# Patient Record
Sex: FEMALE | Race: BLACK OR AFRICAN AMERICAN | NOT HISPANIC OR LATINO | Employment: FULL TIME | URBAN - METROPOLITAN AREA
[De-identification: names, ages, dates, MRNs, and addresses within clinical notes are randomized per-mention and may not be internally consistent; named-entity substitution may affect disease eponyms.]

---

## 2018-01-31 ENCOUNTER — TELEPHONE (OUTPATIENT)
Dept: INTERNAL MEDICINE | Facility: CLINIC | Age: 38
End: 2018-01-31

## 2018-01-31 NOTE — TELEPHONE ENCOUNTER
----- Message from Ekta Ruiz sent at 1/31/2018  7:59 AM CST -----  Contact: Pt  Pt called to speak to the nurse requesting an earlier appt and Rx refill. Pt is a previous pt of the provider prior to the doctor coming to [facility].    Pt can be reached at [phone].    Thanks.    CHRISTIAN

## 2018-03-15 ENCOUNTER — OFFICE VISIT (OUTPATIENT)
Dept: INTERNAL MEDICINE | Facility: CLINIC | Age: 38
End: 2018-03-15
Payer: COMMERCIAL

## 2018-03-15 ENCOUNTER — LAB VISIT (OUTPATIENT)
Dept: LAB | Facility: HOSPITAL | Age: 38
End: 2018-03-15
Attending: INTERNAL MEDICINE
Payer: COMMERCIAL

## 2018-03-15 VITALS
HEIGHT: 67 IN | SYSTOLIC BLOOD PRESSURE: 120 MMHG | BODY MASS INDEX: 37.02 KG/M2 | HEART RATE: 62 BPM | DIASTOLIC BLOOD PRESSURE: 84 MMHG | WEIGHT: 235.88 LBS

## 2018-03-15 DIAGNOSIS — Z13.220 LIPID SCREENING: ICD-10-CM

## 2018-03-15 DIAGNOSIS — Z13.0 SCREENING, ANEMIA, DEFICIENCY, IRON: ICD-10-CM

## 2018-03-15 DIAGNOSIS — Z00.00 VISIT FOR ANNUAL HEALTH EXAMINATION: Primary | ICD-10-CM

## 2018-03-15 DIAGNOSIS — Z13.29 SCREENING FOR THYROID DISORDER: ICD-10-CM

## 2018-03-15 DIAGNOSIS — I10 BENIGN ESSENTIAL HYPERTENSION: ICD-10-CM

## 2018-03-15 DIAGNOSIS — Z13.1 SCREENING FOR DIABETES MELLITUS: ICD-10-CM

## 2018-03-15 DIAGNOSIS — E07.89 THYROID FULLNESS: ICD-10-CM

## 2018-03-15 DIAGNOSIS — B35.1 ONYCHOMYCOSIS OF RIGHT GREAT TOE: ICD-10-CM

## 2018-03-15 LAB
ALBUMIN SERPL BCP-MCNC: 3.8 G/DL
ALP SERPL-CCNC: 82 U/L
ALT SERPL W/O P-5'-P-CCNC: 15 U/L
ANION GAP SERPL CALC-SCNC: 7 MMOL/L
AST SERPL-CCNC: 20 U/L
BASOPHILS # BLD AUTO: 0.01 K/UL
BASOPHILS NFR BLD: 0.2 %
BILIRUB SERPL-MCNC: 1.1 MG/DL
BILIRUB UR QL STRIP: NEGATIVE
BUN SERPL-MCNC: 16 MG/DL
CALCIUM SERPL-MCNC: 9.3 MG/DL
CHLORIDE SERPL-SCNC: 105 MMOL/L
CHOLEST SERPL-MCNC: 188 MG/DL
CHOLEST/HDLC SERPL: 3.5 {RATIO}
CLARITY UR REFRACT.AUTO: CLEAR
CO2 SERPL-SCNC: 28 MMOL/L
COLOR UR AUTO: YELLOW
CREAT SERPL-MCNC: 0.8 MG/DL
DIFFERENTIAL METHOD: NORMAL
EOSINOPHIL # BLD AUTO: 0 K/UL
EOSINOPHIL NFR BLD: 0.8 %
ERYTHROCYTE [DISTWIDTH] IN BLOOD BY AUTOMATED COUNT: 13.1 %
EST. GFR  (AFRICAN AMERICAN): >60 ML/MIN/1.73 M^2
EST. GFR  (NON AFRICAN AMERICAN): >60 ML/MIN/1.73 M^2
ESTIMATED AVG GLUCOSE: 97 MG/DL
GLUCOSE SERPL-MCNC: 97 MG/DL
GLUCOSE UR QL STRIP: NEGATIVE
HBA1C MFR BLD HPLC: 5 %
HCT VFR BLD AUTO: 39.3 %
HDLC SERPL-MCNC: 53 MG/DL
HDLC SERPL: 28.2 %
HGB BLD-MCNC: 13.4 G/DL
HGB UR QL STRIP: NEGATIVE
KETONES UR QL STRIP: NEGATIVE
LDLC SERPL CALC-MCNC: 122.2 MG/DL
LEUKOCYTE ESTERASE UR QL STRIP: NEGATIVE
LYMPHOCYTES # BLD AUTO: 2.2 K/UL
LYMPHOCYTES NFR BLD: 42.3 %
MCH RBC QN AUTO: 28.6 PG
MCHC RBC AUTO-ENTMCNC: 34.1 G/DL
MCV RBC AUTO: 84 FL
MONOCYTES # BLD AUTO: 0.5 K/UL
MONOCYTES NFR BLD: 8.9 %
NEUTROPHILS # BLD AUTO: 2.5 K/UL
NEUTROPHILS NFR BLD: 47.6 %
NITRITE UR QL STRIP: NEGATIVE
NONHDLC SERPL-MCNC: 135 MG/DL
PH UR STRIP: 7 [PH] (ref 5–8)
PLATELET # BLD AUTO: 270 K/UL
PMV BLD AUTO: 11 FL
POTASSIUM SERPL-SCNC: 3.9 MMOL/L
PROT SERPL-MCNC: 7.9 G/DL
PROT UR QL STRIP: NEGATIVE
RBC # BLD AUTO: 4.69 M/UL
SODIUM SERPL-SCNC: 140 MMOL/L
SP GR UR STRIP: 1.01 (ref 1–1.03)
TRIGL SERPL-MCNC: 64 MG/DL
TSH SERPL DL<=0.005 MIU/L-ACNC: 1.49 UIU/ML
URN SPEC COLLECT METH UR: NORMAL
UROBILINOGEN UR STRIP-ACNC: NEGATIVE EU/DL
WBC # BLD AUTO: 5.18 K/UL

## 2018-03-15 PROCEDURE — 3074F SYST BP LT 130 MM HG: CPT | Mod: CPTII,S$GLB,, | Performed by: INTERNAL MEDICINE

## 2018-03-15 PROCEDURE — 99385 PREV VISIT NEW AGE 18-39: CPT | Mod: S$GLB,,, | Performed by: INTERNAL MEDICINE

## 2018-03-15 PROCEDURE — 83036 HEMOGLOBIN GLYCOSYLATED A1C: CPT

## 2018-03-15 PROCEDURE — 85025 COMPLETE CBC W/AUTO DIFF WBC: CPT

## 2018-03-15 PROCEDURE — 99999 PR PBB SHADOW E&M-EST. PATIENT-LVL III: CPT | Mod: PBBFAC,,, | Performed by: INTERNAL MEDICINE

## 2018-03-15 PROCEDURE — 36415 COLL VENOUS BLD VENIPUNCTURE: CPT

## 2018-03-15 PROCEDURE — 84443 ASSAY THYROID STIM HORMONE: CPT

## 2018-03-15 PROCEDURE — 81003 URINALYSIS AUTO W/O SCOPE: CPT

## 2018-03-15 PROCEDURE — 80053 COMPREHEN METABOLIC PANEL: CPT

## 2018-03-15 PROCEDURE — 3079F DIAST BP 80-89 MM HG: CPT | Mod: CPTII,S$GLB,, | Performed by: INTERNAL MEDICINE

## 2018-03-15 PROCEDURE — 80061 LIPID PANEL: CPT

## 2018-03-15 RX ORDER — HYDROCHLOROTHIAZIDE 12.5 MG/1
25 CAPSULE ORAL DAILY
Status: CANCELLED | OUTPATIENT
Start: 2018-03-15

## 2018-03-15 RX ORDER — HYDROCHLOROTHIAZIDE 25 MG/1
25 TABLET ORAL DAILY
Qty: 90 TABLET | Refills: 2 | Status: SHIPPED | OUTPATIENT
Start: 2018-03-15 | End: 2018-08-03 | Stop reason: SDUPTHER

## 2018-03-15 RX ORDER — AMLODIPINE BESYLATE 5 MG/1
5 TABLET ORAL DAILY
COMMUNITY
End: 2018-03-15 | Stop reason: SDUPTHER

## 2018-03-15 RX ORDER — AMLODIPINE BESYLATE 5 MG/1
5 TABLET ORAL DAILY
Qty: 90 TABLET | Refills: 2 | Status: SHIPPED | OUTPATIENT
Start: 2018-03-15 | End: 2018-08-03 | Stop reason: SDUPTHER

## 2018-03-15 NOTE — PROGRESS NOTES
INTERNAL MEDICINE INITIAL VISIT NOTE      CHIEF COMPLAINT     Chief Complaint   Patient presents with    Annual Exam    Medication Refill       HPI     Saskia Huang is a 37 y.o. AA female who presents with HTN, here to re-establish care from [facility].    Still taking meds for HTN.  Says she is trying to eat better and exercise to lose weight to see if she can get off amlodipine in the future.    Denies cp or sob.  Denies h/a or blurry vision.  Occasional low back pain p prolonged sitting, no sciatic sx or leg weakness.    Also says gyn told her she was Hep C + but viral loads undetectable?  Records to be requested.    LMP 5 days ago.    Past Medical History:  Past Medical History:   Diagnosis Date    Benign essential hypertension 3/15/2018    Hypertension        Past Surgical History:  History reviewed. No pertinent surgical history.    Home Medications:  Prior to Admission medications    Medication Sig Start Date End Date Taking? Authorizing Provider   amLODIPine (NORVASC) 5 MG tablet Take 5 mg by mouth once daily.   Yes Historical Provider, MD   hydrochlorothiazide (MICROZIDE) 12.5 mg capsule Take 25 mg by mouth once daily.    Yes Historical Provider, MD   ibuprofen (ADVIL,MOTRIN) 600 MG tablet Take 1 tablet (600 mg total) by mouth every 8 (eight) hours as needed for Pain. 12/16/14   Kaiser Munoz MD       Allergies:  Review of patient's allergies indicates:  No Known Allergies    Family History:  Family History   Problem Relation Age of Onset    Hypertension Mother     No Known Problems Father     Diabetes Paternal Grandmother     Depression Neg Hx     Hearing loss Neg Hx        Social History:  Social History   Substance Use Topics    Smoking status: Never Smoker    Smokeless tobacco: Never Used    Alcohol use Yes      Comment: occasional, on holidays       Review of Systems:  Review of Systems   Constitutional: Negative for appetite change, chills, fatigue, fever and unexpected weight  "change.   HENT: Negative for congestion, hearing loss and rhinorrhea.    Eyes: Negative for pain and visual disturbance.   Respiratory: Negative for cough, chest tightness, shortness of breath and wheezing.    Cardiovascular: Negative for chest pain, palpitations and leg swelling.   Gastrointestinal: Negative for abdominal distention and abdominal pain.   Endocrine: Negative for polydipsia and polyuria.   Genitourinary: Negative for decreased urine volume, difficulty urinating, dysuria, hematuria and vaginal discharge.   Musculoskeletal: Positive for back pain.   Neurological: Negative for weakness, numbness and headaches.   Psychiatric/Behavioral: Negative for behavioral problems and confusion.       Health Maintenance:   Immunizations:   Influenza is not up to date, pt declined, Risks and benefits were discussed with patient.  TDap within 10 yrs per pt, thinks [facility] at [facility]    Cancer Screening:  PAP:  3/2018 at [facility], told normal, records requested  Mammogram:rec at 42  Colonoscopy: rec at 52      PHYSICAL EXAM     /84 (BP Location: Right arm, Patient Position: Sitting, BP Method: Large (Manual))   Pulse 62   Ht 5' 7" (1.702 m)   Wt 107 kg (235 lb 14.3 oz)   BMI 36.95 kg/m²     GEN - A+OX4, NAD   HEENT - PERRL, EOMI, OP clear  Neck - Thyroid fullness noted.  No cervical LAD.   CV - RRR, no m/r/g  Chest - CTAB, no wheezing, crackles, or rhonchi  Abd - S/NT/ND/+BS.   Ext - 2+BDP and radial pulses. No C/C/E.  LN - No LAD appreciated.  Skin - Normal color and texture, no rash, no skin lesions.  R great toe covered c polish but thickening noted.      LABS         ASSESSMENT/PLAN     Saskia Huang is a 37 y.o. female with  Freddreana was seen today for annual exam and medication refill.    Diagnoses and all orders for this visit:    Visit for annual health examination  History and physical exam completed.  Health maintenance reviewed as above.    Benign essential hypertension  At goal, cont meds  -     " "amLODIPine (NORVASC) 5 MG tablet; Take 1 tablet (5 mg total) by mouth once daily.  -     hydroCHLOROthiazide (HYDRODIURIL) 25 MG tablet; Take 1 tablet (25 mg total) by mouth once daily.  -     Comprehensive metabolic panel; Future; Expected date: 03/15/2018  -     URINALYSIS    Screening, anemia, deficiency, iron  -     CBC auto differential; Future; Expected date: 03/15/2018    Lipid screening  -     Lipid panel; Future; Expected date: 03/15/2018    Screening for diabetes mellitus  -     Hemoglobin A1c; Future; Expected date: 03/15/2018    Screening for thyroid disorder  -     TSH; Future; Expected date: 03/15/2018  Thyroid fullness on exam but says she thinks testing done at [facility] was "normal", records requested.    Onychomycosis of right great toe  -     Ambulatory Referral to Dermatology    ?Hep C  Based on what pt was told, pt likely had Hep C Ab but undetectable viral loads so either false +Ab or cleared infection.  Regardless, no further tx at this time but will check basic labs and records from [facility] requested.    HM as above    RTC in 5 months, sooner if needed and depending on labs.    Pebbles Bernal MD  Department of Internal Medicine - [facility]  03/15/2018  "

## 2018-04-05 ENCOUNTER — INITIAL CONSULT (OUTPATIENT)
Dept: DERMATOLOGY | Facility: CLINIC | Age: 38
End: 2018-04-05
Payer: COMMERCIAL

## 2018-04-05 DIAGNOSIS — L21.9 SEBORRHEIC DERMATITIS: ICD-10-CM

## 2018-04-05 DIAGNOSIS — B35.1 ONYCHOMYCOSIS: Primary | ICD-10-CM

## 2018-04-05 PROCEDURE — 99999 PR PBB SHADOW E&M-EST. PATIENT-LVL II: CPT | Mod: PBBFAC,,, | Performed by: NURSE PRACTITIONER

## 2018-04-05 PROCEDURE — 99201 PR OFFICE/OUTPT VISIT,NEW,LEVL I: CPT | Mod: S$GLB,,, | Performed by: NURSE PRACTITIONER

## 2018-04-05 RX ORDER — KETOCONAZOLE 20 MG/ML
SHAMPOO, SUSPENSION TOPICAL
Qty: 120 ML | Refills: 5 | Status: SHIPPED | OUTPATIENT
Start: 2018-04-05 | End: 2019-03-19

## 2018-04-05 RX ORDER — NEOMYCIN SULFATE, POLYMYXIN B SULFATE AND HYDROCORTISONE 10; 3.5; 1 MG/ML; MG/ML; [USP'U]/ML
SUSPENSION/ DROPS AURICULAR (OTIC)
Qty: 30 ML | Refills: 3 | Status: SHIPPED | OUTPATIENT
Start: 2018-04-05 | End: 2019-03-19

## 2018-04-05 NOTE — LETTER
April 5, 2018      Pebbles Bernal MD  [address]  [facility] [address]           [facility] - Dermatology  [address]  [facility] [address]  Phone: [phone]  Fax: [phone]          Patient: Saskia Huang   MR Number: [id number]   YOB: 1980   Date of Visit: 4/5/2018       Dear Dr. Martel:    Thank you for referring Saskia Huang to me for evaluation. Attached you will find relevant portions of my assessment and plan of care.    If you have questions, please do not hesitate to call me. I look forward to following Saskia Huang along with you.    Sincerely,    Dayna Simmons, SINAN    Enclosure  CC:  No Recipients    If you would like to receive this communication electronically, please contact [email or web address] or [phone] to request more information on [employer] access.    For providers and/or their staff who would like to refer a patient to [facility], please contact us through our one-stop-shop provider referral line, [facility], at [phone].    If you feel you have received this communication in error or would no longer like to receive these types of communications, please e-mail [email or web address]

## 2018-04-05 NOTE — PROGRESS NOTES
Subjective:       Patient ID:  Saskia Huang is a 37 y.o. female who presents for   Chief Complaint   Patient presents with    Nail Problem     left and right big toe    Dry Skin     ears     Hair/Scalp Problem     face and chest     This is a new patient here for evaluation nail issues       Nail Problem  - Initial  Affected locations: left toes (Left great toe)  Duration: 6 years  Signs and Symptoms: Noticed Green color in nail with occasional tenderness. Currently has blue nail polish on.  Severity: mild  Timing: constant  Aggravated by: nothing  Relieving factors/Treatments tried: nothing  Improvement on treatment: no relief    Hair/Scalp Problem  - Initial  Affected locations: face and left ear  Duration: 10 years  Signs / symptoms: itching, scaling and dryness  Severity: mild  Timing: intermittent  Aggravated by: nothing  Relieving factors/Treatments tried: nothing  Improvement on treatment: no relief        Review of Systems   Constitutional: Negative for fever.   Skin: Positive for itching ( scalp) and tendency to form keloidal scars. Negative for rash and dry skin.   Hematologic/Lymphatic: Does not bruise/bleed easily.        Objective:    Physical Exam   Constitutional: She appears well-developed and well-nourished. She is obese.  No distress.   Neurological: She is alert and oriented to person, place, and time. She is not disoriented.   Psychiatric: She has a normal mood and affect.   Skin:   Areas Examined (abnormalities noted in diagram):   Scalp / Hair Palpated and Inspected  Head / Face Inspection Performed                  Diagram Legend     Erythematous scaling macule/papule c/w actinic keratosis       Vascular papule c/w angioma      Pigmented verrucoid papule/plaque c/w seborrheic keratosis      Yellow umbilicated papule c/w sebaceous hyperplasia      Irregularly shaped tan macule c/w lentigo     1-2 mm smooth white papules consistent with Milia      Movable subcutaneous cyst with  punctum c/w epidermal inclusion cyst      Subcutaneous movable cyst c/w pilar cyst      Firm pink to brown papule c/w dermatofibroma      Pedunculated fleshy papule(s) c/w skin tag(s)      Evenly pigmented macule c/w junctional nevus     Mildly variegated pigmented, slightly irregular-bordered macule c/w mildly atypical nevus      Flesh colored to evenly pigmented papule c/w intradermal nevus       Pink pearly papule/plaque c/w basal cell carcinoma      Erythematous hyperkeratotic cursted plaque c/w SCC      Surgical scar with no sign of skin cancer recurrence      Open and closed comedones      Inflammatory papules and pustules      Verrucoid papule consistent consistent with wart     Erythematous eczematous patches and plaques     Dystrophic onycholytic nail with subungual debris c/w onychomycosis     Umbilicated papule    Erythematous-base heme-crusted tan verrucoid plaque consistent with inflamed seborrheic keratosis     Erythematous Silvery Scaling Plaque c/w Psoriasis     See annotation      Assessment / Plan:        Onychomycosis- possible Pseudomonas   -     neomycin-polymyxin-hydrocortisone (CORTISPORIN) 3.5-10,000-1 mg/mL-unit/mL-% otic suspension; 2 drops under affected nail(s) bid  Dispense: 30 mL; Refill: 3    Seborrheic dermatitis  -     ketoconazole (NIZORAL) 2 % shampoo; Wash hair with medicated shampoo at least 2x/week - let sit on scalp at least 5 minutes prior to rinsing  Dispense: 120 mL; Refill: 5             Follow-up in about 2 months (around 6/5/2018).

## 2018-08-03 DIAGNOSIS — I10 BENIGN ESSENTIAL HYPERTENSION: ICD-10-CM

## 2018-08-03 RX ORDER — HYDROCHLOROTHIAZIDE 25 MG/1
25 TABLET ORAL DAILY
Qty: 90 TABLET | Refills: 0 | Status: SHIPPED | OUTPATIENT
Start: 2018-08-03 | End: 2019-01-08 | Stop reason: SDUPTHER

## 2018-08-03 RX ORDER — AMLODIPINE BESYLATE 5 MG/1
5 TABLET ORAL DAILY
Qty: 90 TABLET | Refills: 0 | Status: SHIPPED | OUTPATIENT
Start: 2018-08-03 | End: 2019-03-19 | Stop reason: SDUPTHER

## 2018-08-03 NOTE — TELEPHONE ENCOUNTER
----- Message from Elizabeth Islas sent at 8/3/2018  9:46 AM CDT -----  Contact: pt [phone]  Pt called to request refills for 2 of her meds to be called in to:    [facility] [id number] - [address] AT [facility] [phone] (Phone)  [phone] (Fax)    amLODIPine (NORVASC) 5 MG tablet 90 tablet 2 3/15/2018    Sig - Route: Take 1 tablet (5 mg total) by mouth once daily. - Oral     hydroCHLOROthiazide (HYDRODIURIL) 25 MG tablet 90 tablet 2 3/15/2018 3/15/2019   Sig - Route: Take 1 tablet (25 mg total) by mouth once daily. - Oral     Pt was last seen March 2018    Thanks  miles

## 2019-01-08 DIAGNOSIS — I10 BENIGN ESSENTIAL HYPERTENSION: ICD-10-CM

## 2019-01-09 RX ORDER — HYDROCHLOROTHIAZIDE 25 MG/1
TABLET ORAL
Qty: 90 TABLET | Refills: 0 | Status: SHIPPED | OUTPATIENT
Start: 2019-01-09 | End: 2019-03-19 | Stop reason: SDUPTHER

## 2019-03-19 ENCOUNTER — OFFICE VISIT (OUTPATIENT)
Dept: INTERNAL MEDICINE | Facility: CLINIC | Age: 39
End: 2019-03-19
Payer: COMMERCIAL

## 2019-03-19 VITALS
DIASTOLIC BLOOD PRESSURE: 64 MMHG | OXYGEN SATURATION: 99 % | HEART RATE: 84 BPM | BODY MASS INDEX: 37.51 KG/M2 | WEIGHT: 239 LBS | SYSTOLIC BLOOD PRESSURE: 110 MMHG | HEIGHT: 67 IN

## 2019-03-19 DIAGNOSIS — Z00.00 ANNUAL PHYSICAL EXAM: Primary | ICD-10-CM

## 2019-03-19 DIAGNOSIS — Z13.1 SCREENING FOR DIABETES MELLITUS: ICD-10-CM

## 2019-03-19 DIAGNOSIS — Z13.29 SCREENING FOR THYROID DISORDER: ICD-10-CM

## 2019-03-19 DIAGNOSIS — Z13.0 SCREENING, ANEMIA, DEFICIENCY, IRON: ICD-10-CM

## 2019-03-19 DIAGNOSIS — I10 BENIGN ESSENTIAL HYPERTENSION: ICD-10-CM

## 2019-03-19 PROCEDURE — 3078F DIAST BP <80 MM HG: CPT | Mod: CPTII,S$GLB,, | Performed by: INTERNAL MEDICINE

## 2019-03-19 PROCEDURE — 99395 PREV VISIT EST AGE 18-39: CPT | Mod: S$GLB,,, | Performed by: INTERNAL MEDICINE

## 2019-03-19 PROCEDURE — 3078F PR MOST RECENT DIASTOLIC BLOOD PRESSURE < 80 MM HG: ICD-10-PCS | Mod: CPTII,S$GLB,, | Performed by: INTERNAL MEDICINE

## 2019-03-19 PROCEDURE — 99999 PR PBB SHADOW E&M-EST. PATIENT-LVL III: ICD-10-PCS | Mod: PBBFAC,,, | Performed by: INTERNAL MEDICINE

## 2019-03-19 PROCEDURE — 3074F PR MOST RECENT SYSTOLIC BLOOD PRESSURE < 130 MM HG: ICD-10-PCS | Mod: CPTII,S$GLB,, | Performed by: INTERNAL MEDICINE

## 2019-03-19 PROCEDURE — 99999 PR PBB SHADOW E&M-EST. PATIENT-LVL III: CPT | Mod: PBBFAC,,, | Performed by: INTERNAL MEDICINE

## 2019-03-19 PROCEDURE — 99395 PR PREVENTIVE VISIT,EST,18-39: ICD-10-PCS | Mod: S$GLB,,, | Performed by: INTERNAL MEDICINE

## 2019-03-19 PROCEDURE — 3074F SYST BP LT 130 MM HG: CPT | Mod: CPTII,S$GLB,, | Performed by: INTERNAL MEDICINE

## 2019-03-19 RX ORDER — HYDROCHLOROTHIAZIDE 25 MG/1
25 TABLET ORAL DAILY
Qty: 90 TABLET | Refills: 3 | Status: SHIPPED | OUTPATIENT
Start: 2019-03-19 | End: 2020-04-15

## 2019-03-19 RX ORDER — AMLODIPINE BESYLATE 5 MG/1
5 TABLET ORAL DAILY
Qty: 90 TABLET | Refills: 3 | Status: SHIPPED | OUTPATIENT
Start: 2019-03-19 | End: 2020-04-15

## 2019-03-19 NOTE — PROGRESS NOTES
INTERNAL MEDICINE ESTABLISHED PATIENT VISIT NOTE    Subjective:     Chief Complaint: Annual Exam       Patient ID: Saskia Huang is a 38 y.o. female with HTN, last seen by me a year ago, here today for annual exam.    Has been on HCTZ and Amlodipine for HTN s issues.  Denies cp or sob.  Reports she is currently trying to get pregnant.  Had a miscarriage many years ago followed by a successful pregnancy complicated by pre-eclampsia and that child is now 12 years ago.  Had an IUD which was removed a year ago but has been unable to get pregnant since despite using an ovulation buck.    Past Medical History:  Past Medical History:   Diagnosis Date    Benign essential hypertension 3/15/2018    Hypertension        Home Medications:  Prior to Admission medications    Medication Sig Start Date End Date Taking? Authorizing Provider   amLODIPine (NORVASC) 5 MG tablet Take 1 tablet (5 mg total) by mouth once daily. 8/3/18   Klarissa Nguyen MD   hydroCHLOROthiazide (HYDRODIURIL) 25 MG tablet TAKE 1 TABLET(25 MG) BY MOUTH EVERY DAY 1/9/19   Felicity Truong MD   ketoconazole (NIZORAL) 2 % shampoo Wash hair with medicated shampoo at least 2x/week - let sit on scalp at least 5 minutes prior to rinsing 4/5/18   Dayna Simmons NP   neomycin-polymyxin-hydrocortisone (CORTISPORIN) 3.5-10,000-1 mg/mL-unit/mL-% otic suspension 2 drops under affected nail(s) bid 4/5/18   Dayna Simmons NP       Allergies:  Review of patient's allergies indicates:  No Known Allergies    Social History:  Social History     Tobacco Use    Smoking status: Never Smoker    Smokeless tobacco: Never Used   Substance Use Topics    Alcohol use: Yes     Comment: occasional, on holidays    Drug use: No        Review of Systems   Constitutional: Negative for appetite change, chills, fatigue, fever and unexpected weight change.   HENT: Negative for congestion, hearing loss and rhinorrhea.    Eyes: Negative for pain and visual disturbance.  "  Respiratory: Negative for cough, chest tightness, shortness of breath and wheezing.    Cardiovascular: Negative for chest pain, palpitations and leg swelling.   Gastrointestinal: Negative for abdominal distention and abdominal pain.   Endocrine: Negative for polydipsia and polyuria.   Genitourinary: Negative for decreased urine volume, difficulty urinating, dysuria, hematuria and vaginal discharge.   Neurological: Negative for weakness, numbness and headaches.   Psychiatric/Behavioral: Negative for behavioral problems and confusion.         Health Maintenance:     Immunizations:   Influenza is not up to date, pt declined, Risks and benefits were discussed with patient.  TDap within 10 yrs per pt, thinks possibly at [facility]     Cancer Screening:  PAP:  3/2018 at [facility], told normal, records requested, has f/u at [facility] today  Mammogram:rec at 42  Colonoscopy: rec at 52    Objective:   /64 (BP Location: Left arm, Patient Position: Sitting, BP Method: Large (Manual))   Pulse 84   Ht 5' 7" (1.702 m)   Wt 108.4 kg (238 lb 15.7 oz)   LMP 03/16/2019   SpO2 99%   BMI 37.43 kg/m²        General: AAO x3, no apparent distress  HEENT: PERRL, OP clear, thyroid gland appears full and firm  CV: RRR, no m/r/g  Pulm: Lungs CTAB, no crackles, no wheezes  Abd: s/NT/ND +BS  Extremities: no c/c/e    Labs:     Lab Results   Component Value Date    WBC 5.18 03/15/2018    HGB 13.4 03/15/2018    HCT 39.3 03/15/2018    MCV 84 03/15/2018     03/15/2018     CMP  Sodium   Date Value Ref Range Status   03/15/2018 140 136 - 145 mmol/L Final     Potassium   Date Value Ref Range Status   03/15/2018 3.9 3.5 - 5.1 mmol/L Final     Chloride   Date Value Ref Range Status   03/15/2018 105 95 - 110 mmol/L Final     CO2   Date Value Ref Range Status   03/15/2018 28 23 - 29 mmol/L Final     Glucose   Date Value Ref Range Status   03/15/2018 97 70 - 110 mg/dL Final     BUN, Bld   Date Value Ref Range Status   03/15/2018 16 6 - 20 mg/dL Final "     Creatinine   Date Value Ref Range Status   03/15/2018 0.8 0.5 - 1.4 mg/dL Final     Calcium   Date Value Ref Range Status   03/15/2018 9.3 8.7 - 10.5 mg/dL Final     Total Protein   Date Value Ref Range Status   03/15/2018 7.9 6.0 - 8.4 g/dL Final     Albumin   Date Value Ref Range Status   03/15/2018 3.8 3.5 - 5.2 g/dL Final     Total Bilirubin   Date Value Ref Range Status   03/15/2018 1.1 (H) 0.1 - 1.0 mg/dL Final     Comment:     For infants and newborns, interpretation of results should be based  on gestational age, weight and in agreement with clinical  observations.  Premature Infant recommended reference ranges:  Up to 24 hours.............<8.0 mg/dL  Up to 48 hours............<12.0 mg/dL  3-5 days..................<15.0 mg/dL  6-29 days.................<15.0 mg/dL       Alkaline Phosphatase   Date Value Ref Range Status   03/15/2018 82 55 - 135 U/L Final     AST   Date Value Ref Range Status   03/15/2018 20 10 - 40 U/L Final     ALT   Date Value Ref Range Status   03/15/2018 15 10 - 44 U/L Final     Anion Gap   Date Value Ref Range Status   03/15/2018 7 (L) 8 - 16 mmol/L Final     eGFR if    Date Value Ref Range Status   03/15/2018 >60 >60 mL/min/1.73 m^2 Final     eGFR if non    Date Value Ref Range Status   03/15/2018 >60 >60 mL/min/1.73 m^2 Final     Comment:     Calculation used to obtain the estimated glomerular filtration  rate (eGFR) is the CKD-EPI equation.        Lab Results   Component Value Date    LDLCALC 122.2 03/15/2018     Lab Results   Component Value Date    TSH 1.486 03/15/2018     Lab Results   Component Value Date    HGBA1C 5.0 03/15/2018         Assessment/Plan     Saskia was seen today for annual exam.    Diagnoses and all orders for this visit:    Annual physical exam  History and physical exam completed.  Health maintenance reviewed as above.  -     Comprehensive metabolic panel; Future  -     Lipid panel; Future    Benign essential  hypertension  at goal.  Cont current medications.  -     Comprehensive metabolic panel; Future  -     amLODIPine (NORVASC) 5 MG tablet; Take 1 tablet (5 mg total) by mouth once daily.  -     hydroCHLOROthiazide (HYDRODIURIL) 25 MG tablet; Take 1 tablet (25 mg total) by mouth once daily.  -     URINALYSIS; Future    Screening for diabetes mellitus  -     Hemoglobin A1c; Future    Screening, anemia, deficiency, iron  -     CBC auto differential; Future    Screening for thyroid disorder  Thyroid gland also appears full, will request records of u/s at [facility] to see if additional f/u u/s needed but pt states prior w/u was normal.  -     TSH; Future        HM as above  RTC in 6 mos for f/u, labs Monday when fasting.    Pebbles Bernal MD  Department of Internal Medicine - [facility]  03/19/2019

## 2019-05-23 ENCOUNTER — TELEPHONE (OUTPATIENT)
Dept: INTERNAL MEDICINE | Facility: CLINIC | Age: 39
End: 2019-05-23

## 2019-05-23 NOTE — TELEPHONE ENCOUNTER
----- Message from Sana Lal sent at 5/23/2019  7:23 AM CDT -----  Contact: Self  Pt is calling to speak with you because she has concerns about her blood sugar.    She can be reached at [phone].    Thank you.

## 2019-05-23 NOTE — TELEPHONE ENCOUNTER
Spoke with pt and she states she have a tingling feeling in her shoulder area. Pt was concerned about her blood sugar but I told her 101 after meals is ok. Pt bp was 117/71. Pt will go to urgent care or ER if sx get worse but pt states she can wait until Monday to see a provider.

## 2019-06-04 ENCOUNTER — HOSPITAL ENCOUNTER (EMERGENCY)
Facility: HOSPITAL | Age: 39
Discharge: HOME | End: 2019-06-04
Attending: EMERGENCY MEDICINE
Payer: COMMERCIAL

## 2019-06-04 VITALS
BODY MASS INDEX: 37.67 KG/M2 | HEART RATE: 80 BPM | RESPIRATION RATE: 16 BRPM | OXYGEN SATURATION: 100 % | SYSTOLIC BLOOD PRESSURE: 127 MMHG | WEIGHT: 240 LBS | DIASTOLIC BLOOD PRESSURE: 70 MMHG | HEIGHT: 67 IN | TEMPERATURE: 99 F

## 2019-06-04 DIAGNOSIS — R07.9 CHEST PAIN: ICD-10-CM

## 2019-06-04 DIAGNOSIS — R07.89 CHEST PAIN, NON-CARDIAC: Primary | ICD-10-CM

## 2019-06-04 LAB
ALBUMIN SERPL BCP-MCNC: 3.6 G/DL (ref 3.5–5.2)
ALP SERPL-CCNC: 82 U/L (ref 55–135)
ALT SERPL W/O P-5'-P-CCNC: 11 U/L (ref 10–44)
ANION GAP SERPL CALC-SCNC: 7 MMOL/L (ref 8–16)
AST SERPL-CCNC: 19 U/L (ref 10–40)
B-HCG UR QL: NEGATIVE
BASOPHILS # BLD AUTO: 0.03 K/UL (ref 0–0.2)
BASOPHILS NFR BLD: 0.4 % (ref 0–1.9)
BILIRUB SERPL-MCNC: 0.7 MG/DL (ref 0.1–1)
BNP SERPL-MCNC: 13 PG/ML (ref 0–99)
BUN SERPL-MCNC: 16 MG/DL (ref 6–20)
CALCIUM SERPL-MCNC: 9.7 MG/DL (ref 8.7–10.5)
CHLORIDE SERPL-SCNC: 104 MMOL/L (ref 95–110)
CO2 SERPL-SCNC: 27 MMOL/L (ref 23–29)
CREAT SERPL-MCNC: 0.9 MG/DL (ref 0.5–1.4)
CTP QC/QA: YES
DIFFERENTIAL METHOD: NORMAL
EOSINOPHIL # BLD AUTO: 0.1 K/UL (ref 0–0.5)
EOSINOPHIL NFR BLD: 0.9 % (ref 0–8)
ERYTHROCYTE [DISTWIDTH] IN BLOOD BY AUTOMATED COUNT: 12.8 % (ref 11.5–14.5)
EST. GFR  (AFRICAN AMERICAN): >60 ML/MIN/1.73 M^2
EST. GFR  (NON AFRICAN AMERICAN): >60 ML/MIN/1.73 M^2
GLUCOSE SERPL-MCNC: 89 MG/DL (ref 70–110)
HCT VFR BLD AUTO: 39.3 % (ref 37–48.5)
HGB BLD-MCNC: 12.9 G/DL (ref 12–16)
IMM GRANULOCYTES # BLD AUTO: 0.02 K/UL (ref 0–0.04)
IMM GRANULOCYTES NFR BLD AUTO: 0.3 % (ref 0–0.5)
LYMPHOCYTES # BLD AUTO: 3.1 K/UL (ref 1–4.8)
LYMPHOCYTES NFR BLD: 44.3 % (ref 18–48)
MCH RBC QN AUTO: 28.9 PG (ref 27–31)
MCHC RBC AUTO-ENTMCNC: 32.8 G/DL (ref 32–36)
MCV RBC AUTO: 88 FL (ref 82–98)
MONOCYTES # BLD AUTO: 0.5 K/UL (ref 0.3–1)
MONOCYTES NFR BLD: 7 % (ref 4–15)
NEUTROPHILS # BLD AUTO: 3.3 K/UL (ref 1.8–7.7)
NEUTROPHILS NFR BLD: 47.1 % (ref 38–73)
NRBC BLD-RTO: 0 /100 WBC
PLATELET # BLD AUTO: 230 K/UL (ref 150–350)
PMV BLD AUTO: 11.1 FL (ref 9.2–12.9)
POCT GLUCOSE: 114 MG/DL (ref 70–110)
POTASSIUM SERPL-SCNC: 3.9 MMOL/L (ref 3.5–5.1)
PROT SERPL-MCNC: 8 G/DL (ref 6–8.4)
RBC # BLD AUTO: 4.47 M/UL (ref 4–5.4)
SODIUM SERPL-SCNC: 138 MMOL/L (ref 136–145)
TROPONIN I SERPL DL<=0.01 NG/ML-MCNC: 0.01 NG/ML (ref 0–0.03)
WBC # BLD AUTO: 7 K/UL (ref 3.9–12.7)

## 2019-06-04 PROCEDURE — 84484 ASSAY OF TROPONIN QUANT: CPT

## 2019-06-04 PROCEDURE — 63600175 PHARM REV CODE 636 W HCPCS: Performed by: PHYSICIAN ASSISTANT

## 2019-06-04 PROCEDURE — 82962 GLUCOSE BLOOD TEST: CPT

## 2019-06-04 PROCEDURE — 85025 COMPLETE CBC W/AUTO DIFF WBC: CPT

## 2019-06-04 PROCEDURE — 80053 COMPREHEN METABOLIC PANEL: CPT

## 2019-06-04 PROCEDURE — 99285 EMERGENCY DEPT VISIT HI MDM: CPT | Mod: 25

## 2019-06-04 PROCEDURE — 99284 PR EMERGENCY DEPT VISIT,LEVEL IV: ICD-10-PCS | Mod: ,,, | Performed by: PHYSICIAN ASSISTANT

## 2019-06-04 PROCEDURE — 93010 EKG 12-LEAD: ICD-10-PCS | Mod: ,,, | Performed by: INTERNAL MEDICINE

## 2019-06-04 PROCEDURE — 93005 ELECTROCARDIOGRAM TRACING: CPT

## 2019-06-04 PROCEDURE — 81025 URINE PREGNANCY TEST: CPT | Performed by: PHYSICIAN ASSISTANT

## 2019-06-04 PROCEDURE — 96374 THER/PROPH/DIAG INJ IV PUSH: CPT

## 2019-06-04 PROCEDURE — 99284 EMERGENCY DEPT VISIT MOD MDM: CPT | Mod: ,,, | Performed by: PHYSICIAN ASSISTANT

## 2019-06-04 PROCEDURE — 93010 ELECTROCARDIOGRAM REPORT: CPT | Mod: ,,, | Performed by: INTERNAL MEDICINE

## 2019-06-04 PROCEDURE — 83880 ASSAY OF NATRIURETIC PEPTIDE: CPT

## 2019-06-04 RX ORDER — NAPROXEN 500 MG/1
500 TABLET ORAL 2 TIMES DAILY WITH MEALS
Qty: 10 TABLET | Refills: 0 | Status: SHIPPED | OUTPATIENT
Start: 2019-06-04 | End: 2019-09-19

## 2019-06-04 RX ORDER — KETOROLAC TROMETHAMINE 30 MG/ML
10 INJECTION, SOLUTION INTRAMUSCULAR; INTRAVENOUS
Status: COMPLETED | OUTPATIENT
Start: 2019-06-04 | End: 2019-06-04

## 2019-06-04 RX ADMIN — KETOROLAC TROMETHAMINE 10 MG: 30 INJECTION, SOLUTION INTRAMUSCULAR; INTRAVENOUS at 08:06

## 2019-06-05 NOTE — ED PROVIDER NOTES
Encounter Date: 6/4/2019    SCRIBE #1 NOTE: I, Tania Virgil, am scribing for, and in the presence of,  Jazlyn Hernandes MD. I have scribed the following portions of the note - the EKG reading.       History     Chief Complaint   Patient presents with    Chest Pain     x 2 days, describes as nonradiating needle pricks intermittently.  resolves when lifting arm in the air.    Leg Swelling     38-year-old female with past medical history of HTN presents to the ED with a chief complaint of chest pain.  Patient reports left-sided upper chest wall pain that began last night.  Patient describes the pain as sharp, stabbing like a needle.  The pain has been episodic since yesterday.  She reports a handful of episodes. The pain lasts for only a couple of minutes.  Pain improves if patient raises her arm.  She also reports some tingling in the left arm, but states that this has been present prior to the chest pain.  Patient denies shortness breath.  She does report a recent dry cough.  Denies fever, chills. Patient also complains of intermittent swelling to the ankles and feet over the past year.  Swelling is usually worse when she is on her feet.  Patient is typically sedentary as she works at home.  She denies calf pain or swelling, hormone use, recent immobilization.  Denies family or personal cardiac history.        Review of patient's allergies indicates:  No Known Allergies  Past Medical History:   Diagnosis Date    Benign essential hypertension 3/15/2018    Hypertension      History reviewed. No pertinent surgical history.  Family History   Problem Relation Age of Onset    Hypertension Mother     No Known Problems Father     Diabetes Paternal Grandmother     Depression Neg Hx     Hearing loss Neg Hx     Melanoma Neg Hx      Social History     Tobacco Use    Smoking status: Never Smoker    Smokeless tobacco: Never Used   Substance Use Topics    Alcohol use: Yes     Comment: occasional, on holidays    Drug  use: No     Review of Systems   Constitutional: Negative for chills and fever.   HENT: Negative for sore throat.    Respiratory: Positive for cough. Negative for shortness of breath.    Cardiovascular: Positive for chest pain and leg swelling.   Gastrointestinal: Negative for nausea and vomiting.   Genitourinary: Negative for dysuria.   Musculoskeletal: Negative for back pain.   Skin: Negative for rash.   Neurological: Negative for weakness.   Hematological: Does not bruise/bleed easily.       Physical Exam     Initial Vitals [06/04/19 1916]   BP Pulse Resp Temp SpO2   127/70 80 16 98.5 °F (36.9 °C) 100 %      MAP       --         Physical Exam    Nursing note and vitals reviewed.  Constitutional: She appears well-developed and well-nourished. She is not diaphoretic.  Non-toxic appearance. She does not appear ill. No distress.   HENT:   Head: Normocephalic and atraumatic.   Neck: Neck supple.   Cardiovascular: Normal rate and regular rhythm. Exam reveals no gallop and no friction rub.    No murmur heard.  Nonpitting edema to the ankles bilaterally.   Pulmonary/Chest: Effort normal and breath sounds normal. No accessory muscle usage. No tachypnea. No respiratory distress. She has no decreased breath sounds. She has no wheezes. She has no rhonchi. She has no rales.       Tenderness to the left upper chest wall that is different in quality to patient's stated complaint.   Abdominal: She exhibits no distension.   Neurological: She is alert.   Skin: No rash noted.   Psychiatric: She has a normal mood and affect. Her behavior is normal.         ED Course   Procedures  Labs Reviewed   COMPREHENSIVE METABOLIC PANEL - Abnormal; Notable for the following components:       Result Value    Anion Gap 7 (*)     All other components within normal limits   POCT GLUCOSE - Abnormal; Notable for the following components:    POCT Glucose 114 (*)     All other components within normal limits   B-TYPE NATRIURETIC PEPTIDE   CBC W/ AUTO  DIFFERENTIAL   TROPONIN I   POCT URINE PREGNANCY     EKG Readings: (Independently Interpreted)   Normal sinus rhythm, rate 65. . QRS 86 . QTc 455. T wave inversion in leads III and lesser in V3      ECG Results          EKG 12-lead (Final result)  Result time 06/05/19 10:14:05    Final result by Interface, Lab In [facility] (06/05/19 10:14:05)                 Narrative:    Test Reason : R07.9,    Vent. Rate : 065 BPM     Atrial Rate : 065 BPM     P-R Int : 190 ms          QRS Dur : 086 ms      QT Int : 438 ms       P-R-T Axes : 032 -02 -01 degrees     QTc Int : 455 ms    Normal sinus rhythm  Nonspecific ST and/or T wave abnormalities  No previous ECGs available  Confirmed by JESSIE LYONS MD ([id number]) on 6/5/2019 10:13:56 AM    Referred By: ESTEBAN   SELF           Confirmed By:JESSIE LYONS MD                            Imaging Results          X-Ray Chest PA And Lateral (Final result)  Result time 06/04/19 20:50:05    Final result by Ladi Payne MD (06/04/19 20:50:05)                 Impression:      Normal.      Electronically signed by: Ladi Payne  Date:    06/04/2019  Time:    20:50             Narrative:    EXAMINATION:  XR CHEST PA AND LATERAL    CLINICAL HISTORY:  Chest pain, unspecified    TECHNIQUE:  PA and lateral views of the chest were performed.    COMPARISON:  None    FINDINGS:  Frontal and lateral views.    Heart and lungs are normal.  No pneumothorax or pleural effusion or edema or consolidation or nodule.  The visualized spine and trachea and abdomen appear normal.  No fracture found.                              X-Rays:   Independently Interpreted Readings:   Chest X-Ray: Normal heart size.  No infiltrates.  No acute abnormalities.     Medical Decision Making:   History:   Old Medical Records: I decided to obtain old medical records.  Differential Diagnosis:   My differential diagnosis includes but is not limited to:  Costochondritis, pleurisy, chest wall strain, ACS,  pneumothorax  Independently Interpreted Test(s):   I have ordered and independently interpreted X-rays - see prior notes.  I have ordered and independently interpreted EKG Reading(s) - see prior notes  Clinical Tests:   Radiological Study: Ordered and Reviewed  Medical Tests: Ordered and Reviewed       APC / Resident Notes:   38-year-old female presents for evaluation of chest pain. Vitals within normal limits.  Patient is in no acute distress. Pertinent physical exam findings are noted above.  Patient is low risk for PE.    EKG without ischemic patterns.  Chest x-ray unremarkable. Cardiac enzymes are unremarkable. Patient likely with musculoskeletal etiology.  I will treat with naproxen.  Patient advised to follow up with her PCP in 1 week if no improvement with treatment.  ED return precautions given.  Stable for discharge. I have reviewed the patient's records and discussed this case with my supervising physician.         Scribe Attestation:   Scribe #1: I performed the above scribed service and the documentation accurately describes the services I performed. I attest to the accuracy of the note.    Attending Attestation:     Physician Attestation Statement for NP/PA:   I discussed this assessment and plan of this patient with the NP/PA, but I did not personally examine the patient. The face to face encounter was performed by the NP/PA.                     Clinical Impression:       ICD-10-CM ICD-9-CM   1. Chest pain, non-cardiac R07.89 786.59   2. Chest pain R07.9 786.50         Disposition:   Disposition: Discharged  Condition: Stable                        Ame Padilla PA-C  06/05/19 2048       Jazlyn Hernandes MD  06/10/19 0797

## 2019-06-05 NOTE — ED TRIAGE NOTES
Pt to the ED with complaints of left sided chest wall pain that began yesterday. Tenderness with palpation to left chest. Pain describes as nonradiating, resolves when lifting arm in the air. No SOB. Pt also reports bilateral ankle swelling that has been intermittent over the past 2-3 months.     Patient identifiers verified and correct for Saskia Huang.     LOC: The patient is awake, alert and aware of environment with an appropriate affect, the patient is oriented x 3 and speaking appropriately.   APPEARANCE: Patient appears comfortable and in no acute distress, patient is clean and well groomed.  SKIN: The skin is warm and dry, color consistent with ethnicity, patient has normal skin turgor and moist mucus membranes, skin intact, no breakdown or bruising noted.   MUSCULOSKELETAL: Patient moving all extremities spontaneously, no swelling noted.  RESPIRATORY: Airway is open and patent, respirations are spontaneous, patient has a normal effort and rate, no accessory muscle use noted.  CARDIAC: Patient has a normal rate and regular rhythm, capillary refill < 3 seconds. Patient reports left sided chest wall pain.   GASTRO: Soft and non tender to palpation, no distention noted.   : Pt denies any pain or frequency with urination.  NEURO: Pt opens eyes spontaneously, behavior appropriate to situation, follows commands, facial expression symmetrical, bilateral hand grasp equal and even, purposeful motor response noted, normal sensation in all extremities when touched with a finger.

## 2019-09-19 ENCOUNTER — OFFICE VISIT (OUTPATIENT)
Dept: INTERNAL MEDICINE | Facility: CLINIC | Age: 39
End: 2019-09-19
Payer: COMMERCIAL

## 2019-09-19 VITALS
SYSTOLIC BLOOD PRESSURE: 106 MMHG | BODY MASS INDEX: 38.76 KG/M2 | HEIGHT: 67 IN | DIASTOLIC BLOOD PRESSURE: 82 MMHG | HEART RATE: 79 BPM | WEIGHT: 246.94 LBS

## 2019-09-19 DIAGNOSIS — Z48.02 VISIT FOR SUTURE REMOVAL: ICD-10-CM

## 2019-09-19 DIAGNOSIS — I10 HYPERTENSION, ESSENTIAL: ICD-10-CM

## 2019-09-19 DIAGNOSIS — M54.6 ACUTE THORACIC BACK PAIN, UNSPECIFIED BACK PAIN LATERALITY: Primary | ICD-10-CM

## 2019-09-19 PROCEDURE — 99999 PR PBB SHADOW E&M-EST. PATIENT-LVL III: ICD-10-PCS | Mod: PBBFAC,,, | Performed by: PHYSICIAN ASSISTANT

## 2019-09-19 PROCEDURE — 3008F BODY MASS INDEX DOCD: CPT | Mod: CPTII,S$GLB,, | Performed by: PHYSICIAN ASSISTANT

## 2019-09-19 PROCEDURE — 99214 PR OFFICE/OUTPT VISIT, EST, LEVL IV, 30-39 MIN: ICD-10-PCS | Mod: S$GLB,,, | Performed by: PHYSICIAN ASSISTANT

## 2019-09-19 PROCEDURE — 3079F PR MOST RECENT DIASTOLIC BLOOD PRESSURE 80-89 MM HG: ICD-10-PCS | Mod: CPTII,S$GLB,, | Performed by: PHYSICIAN ASSISTANT

## 2019-09-19 PROCEDURE — 3008F PR BODY MASS INDEX (BMI) DOCUMENTED: ICD-10-PCS | Mod: CPTII,S$GLB,, | Performed by: PHYSICIAN ASSISTANT

## 2019-09-19 PROCEDURE — 99999 PR PBB SHADOW E&M-EST. PATIENT-LVL III: CPT | Mod: PBBFAC,,, | Performed by: PHYSICIAN ASSISTANT

## 2019-09-19 PROCEDURE — 3074F PR MOST RECENT SYSTOLIC BLOOD PRESSURE < 130 MM HG: ICD-10-PCS | Mod: CPTII,S$GLB,, | Performed by: PHYSICIAN ASSISTANT

## 2019-09-19 PROCEDURE — 99214 OFFICE O/P EST MOD 30 MIN: CPT | Mod: S$GLB,,, | Performed by: PHYSICIAN ASSISTANT

## 2019-09-19 PROCEDURE — 3079F DIAST BP 80-89 MM HG: CPT | Mod: CPTII,S$GLB,, | Performed by: PHYSICIAN ASSISTANT

## 2019-09-19 PROCEDURE — 3074F SYST BP LT 130 MM HG: CPT | Mod: CPTII,S$GLB,, | Performed by: PHYSICIAN ASSISTANT

## 2019-09-19 RX ORDER — METHOCARBAMOL 500 MG/1
500 TABLET, FILM COATED ORAL 4 TIMES DAILY
Qty: 40 TABLET | Refills: 0 | Status: SHIPPED | OUTPATIENT
Start: 2019-09-19 | End: 2019-09-29

## 2019-09-19 NOTE — LETTER
September 19, 2019    Saskia Huang  [address]  [place] [address]         [facility]  [address]  [facility] [address]  Phone: [phone]  Fax: [phone] September 19, 2019     Patient: Saskia Huang   YOB: 1980   Date of Visit: 9/19/2019       To Whom It May Concern:    It is my medical opinion that Saskia Huang should remain out of work until September 23, 2019.    If you have any questions or concerns, please don't hesitate to call.    Sincerely,        Isabel Clark PA-C

## 2019-09-19 NOTE — PROGRESS NOTES
Subjective:       Patient ID: Saskia Huang is a 39 y.o. female.        Chief Complaint: Back Pain (Pain=8 x3days) and Suture / Staple Removal (R arm)    Saskia Huang is an established patient of Pebbles Bernal MD here today for urgent care visit.    3 stitches right forearm - placed 3-4 weeks ago, never returned to remove, needs them out, unsure if she received tetanus vaccine at time of sutures    Back pain - upper thoracic, midline and left sided, started 3 days ago, hurts when she twists and moves, has been taking tylenol, also took her mother's muscle relaxer and tramadol (which seemed to really help), no N/V/D/C, no chest pain or shortness of breath, appetite is fine, no injury to the back         Review of Systems   Constitutional: Negative for chills, diaphoresis, fatigue and fever.   HENT: Negative for congestion and sore throat.    Eyes: Negative for visual disturbance.   Respiratory: Negative for cough, chest tightness and shortness of breath.    Cardiovascular: Negative for chest pain, palpitations and leg swelling.   Gastrointestinal: Negative for abdominal pain, blood in stool, constipation, diarrhea, nausea and vomiting.   Genitourinary: Negative for dysuria, frequency, hematuria and urgency.   Musculoskeletal: Positive for back pain. Negative for arthralgias.   Skin: Positive for wound (well healed, sutures in place). Negative for rash.   Neurological: Negative for dizziness, syncope, weakness and headaches.   Psychiatric/Behavioral: Negative for dysphoric mood and sleep disturbance. The patient is not nervous/anxious.        Objective:      Physical Exam   Constitutional: She appears well-developed and well-nourished.   HENT:   Head: Normocephalic.   Right Ear: External ear normal.   Left Ear: External ear normal.   Mouth/Throat: Oropharynx is clear and moist.   Eyes: Pupils are equal, round, and reactive to light.   Cardiovascular: Normal rate, regular rhythm and normal heart sounds.  "Exam reveals no gallop and no friction rub.   No murmur heard.  Pulmonary/Chest: Effort normal and breath sounds normal. No respiratory distress.   Abdominal: Soft. Normal appearance. There is no tenderness. There is no CVA tenderness.   Musculoskeletal: She exhibits no edema.        Thoracic back: She exhibits tenderness (left paravertebral area) and spasm. She exhibits normal range of motion.        Back:         Arms:  Neurological: She is alert. She has normal strength. No sensory deficit.   Skin: Skin is warm and dry.   Psychiatric: She has a normal mood and affect.   Nursing note and vitals reviewed.      Assessment:       1. Acute thoracic back pain, unspecified back pain laterality    2. Visit for suture removal    3. Hypertension, essential        Plan:       Saskia was seen today for back pain and suture / staple removal.    Diagnoses and all orders for this visit:    Acute thoracic back pain, unspecified back pain laterality  -     methocarbamol (ROBAXIN) 500 MG Tab; Take 1 tablet (500 mg total) by mouth 4 (four) times daily. for 10 days    Visit for suture removal: 3 sutures removed, she will try to obtain documentation of tetanus status, unsure when it was administered, only record in [facility] is from 2000...    Hypertension, essential: stable and controlled    Rest, ice, heat, muscle cream, gentle stretching.  Work note provided.    Pt has been given instructions populated from [employer] database and has verbalized understanding of the after visit summary and information contained wherein.    Follow up with a primary care provider. May go to ER for acute shortness of breath, lightheadedness, fever, or any other emergent complaints or changes in condition.    "This note will be shared with the patient"    No future appointments.            "

## 2019-09-19 NOTE — PATIENT INSTRUCTIONS
Ice, muscle cream like bengay or aspercream, heat, gentle stretching    Back Care Tips    Caring for your back  These are things you can do to prevent a recurrence of acute back pain and to reduce symptoms from chronic back pain:  · Maintain a healthy weight. If you are overweight, losing weight will help most types of back pain.  · Exercise is an important part of recovery from most types of back pain. The muscles behind and in front of the spine support the back. This means strengthening both the back muscles and the abdominal muscles will provide better support for your spine.   · Swimming and brisk walking are good overall exercises to improve your fitness level.  · Practice safe lifting methods (below).  · Practice good posture when sitting, standing and walking. Avoid prolonged sitting. This puts more stress on the lower back than standing or walking.  · Wear quality shoes with sufficient arch support. Foot and ankle alignment can affect back symptoms. Women should avoid wearing high heels.  · Therapeutic massage can help relax the back muscles without stretching them.  · During the first 24 to 72 hours after an acute injury or flare-up of chronic back pain, apply an ice pack to the painful area for 20 minutes and then remove it for 20 minutes, over a period of 60 to 90 minutes, or several times a day. As a safety precaution, do not use a heating pad at bedtime. Sleeping on a heating pad can lead to skin burns or tissue damage.  · You can alternate ice and heat therapies.  Medications  Talk to your healthcare provider before using medicines, especially if you have other medical problems or are taking other medicines.  · You may use acetaminophen or ibuprofen to control pain, unless your healthcare provider prescribed other pain medicine. If you have chronic conditions like diabetes, liver or kidney disease, stomach ulcers, or gastrointestinal bleeding, or are taking blood thinners, talk with your healthcare  provider before taking any medicines.  · Be careful if you are given prescription pain medicines, narcotics, or medicine for muscle spasm. They can cause drowsiness, affect your coordination, reflexes, and judgment. Do not drive or operate heavy machinery while taking these types of medicines. Take prescription pain medicine only as prescribed by your healthcare provider.  Lumbar stretch  Here is a simple stretching exercise that will help relax muscle spasm and keep your back more limber. If exercise makes your back pain worse, dont do it.  · Lie on your back with your knees bent and both feet on the ground.  · Slowly raise your left knee to your chest as you flatten your lower back against the floor. Hold for 5 seconds.  · Relax and repeat the exercise with your right knee.  · Do 10 of these exercises for each leg.  Safe lifting method  · Dont bend over at the waist to lift an object off the floor.  Instead, bend your knees and hips in a squat.   · Keep your back and head upright  · Hold the object close to your body, directly in front of you.  · Straighten your legs to lift the object.   · Lower the object to the floor in the reverse fashion.  · If you must slide something across the floor, push it.  Posture tips  Sitting  Sit in chairs with straight backs or low-back support. Keep your knees lower than your hips, with your feet flat on the floor.  When driving, sit up straight. Adjust the seat forward so you are not leaning toward the steering wheel.  A small pillow or rolled towel behind your lower back may help if you are driving long distances.   Standing  When standing for long periods, shift most of your weight to one leg at a time. Alternate legs every few minutes.   Sleeping  The best way to sleep is on your side with your knees bent. Put a low pillow under your head to support your neck in a neutral spine position. Avoid thick pillows that bend your neck to one side. Put a pillow between your legs to  further relax your lower back. If you sleep on your back, put pillows under your knees to support your legs in a slightly flexed position. Use a firm mattress. If your mattress sags, replace it, or use a 1/2-inch plywood board under the mattress to add support.  Follow-up care  Follow up with your healthcare provider, or as advised.  If X-rays, a CT scan or an MRI scan were taken, they will be reviewed by a radiologist. You will be notified of any new findings that may affect your care.  Call 911  Seek emergency medical care if any of the following occur:  · Trouble breathing  · Confusion  · Very drowsy  · Fainting or loss of consciousness  · Rapid or very slow heart rate  · Loss of  bowel or bladder control  When to seek medical care  Call your healthcare provider if any of the following occur:  · Pain becomes worse or spreads to your arms or legs  · Weakness or numbness in one or both arms or legs  · Numbness in the groin area  Date Last Reviewed: 6/1/2016  © 2289-0272 [employer]. [address] [place] [address], [place] [address]. All rights reserved. This information is not intended as a substitute for professional medical care. Always follow your healthcare professional's instructions.

## 2020-01-07 ENCOUNTER — TELEPHONE (OUTPATIENT)
Dept: INTERNAL MEDICINE | Facility: CLINIC | Age: 40
End: 2020-01-07

## 2020-01-07 NOTE — TELEPHONE ENCOUNTER
----- Message from Radha Jimenez sent at 1/7/2020  9:45 AM CST -----  Contact: Self  Pt calling in regards to be seen sooner for annual than on May 4th and needs nurse or provider to give her a call     Please advise pt can be reached at [phone]

## 2020-01-20 ENCOUNTER — IMMUNIZATION (OUTPATIENT)
Dept: PHARMACY | Facility: CLINIC | Age: 40
End: 2020-01-20
Payer: COMMERCIAL

## 2020-01-20 ENCOUNTER — OFFICE VISIT (OUTPATIENT)
Dept: INTERNAL MEDICINE | Facility: CLINIC | Age: 40
End: 2020-01-20
Attending: INTERNAL MEDICINE
Payer: COMMERCIAL

## 2020-01-20 VITALS
WEIGHT: 250.44 LBS | DIASTOLIC BLOOD PRESSURE: 90 MMHG | HEART RATE: 80 BPM | OXYGEN SATURATION: 99 % | HEIGHT: 67 IN | BODY MASS INDEX: 39.31 KG/M2 | SYSTOLIC BLOOD PRESSURE: 126 MMHG

## 2020-01-20 DIAGNOSIS — M54.9 MID BACK PAIN ON LEFT SIDE: ICD-10-CM

## 2020-01-20 DIAGNOSIS — Z00.00 VISIT FOR ANNUAL HEALTH EXAMINATION: Primary | ICD-10-CM

## 2020-01-20 DIAGNOSIS — E66.01 CLASS 2 SEVERE OBESITY DUE TO EXCESS CALORIES WITH SERIOUS COMORBIDITY AND BODY MASS INDEX (BMI) OF 37.0 TO 37.9 IN ADULT: ICD-10-CM

## 2020-01-20 DIAGNOSIS — E07.89 THYROID FULLNESS: ICD-10-CM

## 2020-01-20 DIAGNOSIS — Z13.1 SCREENING FOR DIABETES MELLITUS: ICD-10-CM

## 2020-01-20 DIAGNOSIS — I10 BENIGN ESSENTIAL HYPERTENSION: ICD-10-CM

## 2020-01-20 LAB
BILIRUB UR QL STRIP: NEGATIVE
CAOX CRY UR QL COMP ASSIST: NORMAL
CLARITY UR REFRACT.AUTO: ABNORMAL
COLOR UR AUTO: YELLOW
GLUCOSE UR QL STRIP: NEGATIVE
HGB UR QL STRIP: NEGATIVE
KETONES UR QL STRIP: NEGATIVE
LEUKOCYTE ESTERASE UR QL STRIP: ABNORMAL
MICROSCOPIC COMMENT: NORMAL
NITRITE UR QL STRIP: NEGATIVE
PH UR STRIP: 6 [PH] (ref 5–8)
PROT UR QL STRIP: NEGATIVE
RBC #/AREA URNS AUTO: 1 /HPF (ref 0–4)
SP GR UR STRIP: 1.02 (ref 1–1.03)
SQUAMOUS #/AREA URNS AUTO: 7 /HPF
URN SPEC COLLECT METH UR: ABNORMAL
WBC #/AREA URNS AUTO: 3 /HPF (ref 0–5)

## 2020-01-20 PROCEDURE — 99395 PREV VISIT EST AGE 18-39: CPT | Mod: S$GLB,,, | Performed by: INTERNAL MEDICINE

## 2020-01-20 PROCEDURE — 3080F DIAST BP >= 90 MM HG: CPT | Mod: CPTII,S$GLB,, | Performed by: INTERNAL MEDICINE

## 2020-01-20 PROCEDURE — 3074F SYST BP LT 130 MM HG: CPT | Mod: CPTII,S$GLB,, | Performed by: INTERNAL MEDICINE

## 2020-01-20 PROCEDURE — 99395 PR PREVENTIVE VISIT,EST,18-39: ICD-10-PCS | Mod: S$GLB,,, | Performed by: INTERNAL MEDICINE

## 2020-01-20 PROCEDURE — 99999 PR PBB SHADOW E&M-EST. PATIENT-LVL IV: CPT | Mod: PBBFAC,,, | Performed by: INTERNAL MEDICINE

## 2020-01-20 PROCEDURE — 3074F PR MOST RECENT SYSTOLIC BLOOD PRESSURE < 130 MM HG: ICD-10-PCS | Mod: CPTII,S$GLB,, | Performed by: INTERNAL MEDICINE

## 2020-01-20 PROCEDURE — 3080F PR MOST RECENT DIASTOLIC BLOOD PRESSURE >= 90 MM HG: ICD-10-PCS | Mod: CPTII,S$GLB,, | Performed by: INTERNAL MEDICINE

## 2020-01-20 PROCEDURE — 81001 URINALYSIS AUTO W/SCOPE: CPT

## 2020-01-20 PROCEDURE — 99999 PR PBB SHADOW E&M-EST. PATIENT-LVL IV: ICD-10-PCS | Mod: PBBFAC,,, | Performed by: INTERNAL MEDICINE

## 2020-01-20 RX ORDER — CYCLOBENZAPRINE HCL 5 MG
5 TABLET ORAL NIGHTLY PRN
Qty: 10 TABLET | Refills: 0 | Status: SHIPPED | OUTPATIENT
Start: 2020-01-20 | End: 2022-03-22 | Stop reason: SDUPTHER

## 2020-01-20 NOTE — PROGRESS NOTES
INTERNAL MEDICINE ESTABLISHED PATIENT VISIT NOTE    Subjective:     Chief Complaint: Annual Exam       Patient ID: Saskia Huang is a 39 y.o. female with HTN, last seen by me 3/2019, here today for annual exam and f/u.    States she did not take her BP meds yet this morning as she was fasting for labs.  Normally takes them daily.  Denies cp or sob.    Today c c/o mid to low back pain on L for several months.  Fluctuates in severity.  No associated leg pain or weakness.  Thinks it could be due to weight gain.  Has progressively gained weight over the past few years c difficulty losing it.    Past Medical History:  Past Medical History:   Diagnosis Date    Benign essential hypertension 3/15/2018    Hypertension        Home Medications:  Prior to Admission medications    Medication Sig Start Date End Date Taking? Authorizing Provider   amLODIPine (NORVASC) 5 MG tablet Take 1 tablet (5 mg total) by mouth once daily. 3/19/19  Yes Pebbles Bernal MD   hydroCHLOROthiazide (HYDRODIURIL) 25 MG tablet Take 1 tablet (25 mg total) by mouth once daily. 3/19/19  Yes Pebbles Bernal MD       Allergies:  Review of patient's allergies indicates:  No Known Allergies    Social History:  Social History     Tobacco Use    Smoking status: Never Smoker    Smokeless tobacco: Never Used   Substance Use Topics    Alcohol use: Yes     Comment: occasional, on holidays    Drug use: No        Review of Systems   Constitutional: Negative for appetite change, chills, fatigue, fever and unexpected weight change.   HENT: Negative for congestion, hearing loss and rhinorrhea.    Eyes: Negative for pain and visual disturbance.   Respiratory: Negative for cough, chest tightness, shortness of breath and wheezing.    Cardiovascular: Negative for chest pain, palpitations and leg swelling.   Gastrointestinal: Negative for abdominal distention and abdominal pain.   Endocrine: Negative for polydipsia and polyuria.   Genitourinary: Negative for decreased  "urine volume, difficulty urinating, dysuria, hematuria and vaginal discharge.   Neurological: Negative for weakness, numbness and headaches.   Psychiatric/Behavioral: Negative for behavioral problems and confusion.         Health Maintenance:     Immunizations:   Influenza is not up to date, pt declined, Risks and benefits were discussed with patient.  TDap rec today     Cancer Screening:  PAP:  done at [facility] in 2019, records requested (states she saw an NP or PA)  Mammogram:rec at 42  Colonoscopy: rec at 52     Objective:   BP (!) 126/90   Pulse 80   Ht 5' 7" (1.702 m)   Wt 113.6 kg (250 lb 7.1 oz)   LMP 01/03/2020   SpO2 99%   BMI 39.22 kg/m²        General: AAO x3, no apparent distress, obese.  HEENT: PERRL, OP clear, thyroid enlarged c likely nodules noted on exam.  CV: RRR, no m/r/g  Pulm: Lungs CTAB, no crackles, no wheezes  Abd: s/NT/ND +BS  Extremities: no c/c/e    Labs:     Lab Results   Component Value Date    WBC 7.00 06/04/2019    HGB 12.9 06/04/2019    HCT 39.3 06/04/2019    MCV 88 06/04/2019     06/04/2019     Sodium   Date Value Ref Range Status   06/04/2019 138 136 - 145 mmol/L Final     Potassium   Date Value Ref Range Status   06/04/2019 3.9 3.5 - 5.1 mmol/L Final     Chloride   Date Value Ref Range Status   06/04/2019 104 95 - 110 mmol/L Final     CO2   Date Value Ref Range Status   06/04/2019 27 23 - 29 mmol/L Final     Glucose   Date Value Ref Range Status   06/04/2019 89 70 - 110 mg/dL Final     BUN, Bld   Date Value Ref Range Status   06/04/2019 16 6 - 20 mg/dL Final     Creatinine   Date Value Ref Range Status   06/04/2019 0.9 0.5 - 1.4 mg/dL Final     Calcium   Date Value Ref Range Status   06/04/2019 9.7 8.7 - 10.5 mg/dL Final     Total Protein   Date Value Ref Range Status   06/04/2019 8.0 6.0 - 8.4 g/dL Final     Albumin   Date Value Ref Range Status   06/04/2019 3.6 3.5 - 5.2 g/dL Final     Total Bilirubin   Date Value Ref Range Status   06/04/2019 0.7 0.1 - 1.0 mg/dL Final    "  Comment:     For infants and newborns, interpretation of results should be based  on gestational age, weight and in agreement with clinical  observations.  Premature Infant recommended reference ranges:  Up to 24 hours.............<8.0 mg/dL  Up to 48 hours............<12.0 mg/dL  3-5 days..................<15.0 mg/dL  6-29 days.................<15.0 mg/dL       Alkaline Phosphatase   Date Value Ref Range Status   06/04/2019 82 55 - 135 U/L Final     AST   Date Value Ref Range Status   06/04/2019 19 10 - 40 U/L Final     ALT   Date Value Ref Range Status   06/04/2019 11 10 - 44 U/L Final     Anion Gap   Date Value Ref Range Status   06/04/2019 7 (L) 8 - 16 mmol/L Final     eGFR if    Date Value Ref Range Status   06/04/2019 >60.0 >60 mL/min/1.73 m^2 Final     eGFR if non    Date Value Ref Range Status   06/04/2019 >60.0 >60 mL/min/1.73 m^2 Final     Comment:     Calculation used to obtain the estimated glomerular filtration  rate (eGFR) is the CKD-EPI equation.        Lab Results   Component Value Date    HGBA1C 5.0 03/15/2018     Lab Results   Component Value Date    LDLCALC 122.2 03/15/2018     Lab Results   Component Value Date    TSH 1.486 03/15/2018         Assessment/Plan     Saskia was seen today for annual exam.    Diagnoses and all orders for this visit:    Visit for annual health examination  History and physical exam completed.  Health maintenance reviewed as above.  -     Lipid panel; Future    Benign essential hypertension  Diastolic elevated today but did not take either of her BP meds today.  At goal at last appt when taking it so will cont current regimen.  Advised she can still take meds c water when fasting for labs.  -     Comprehensive metabolic panel; Future  -     URINALYSIS    Thyroid fullness  Suspected nodules, will check u/s  Says u/s done at [facility] in the past but unsure of results, records requested.  -     US Soft Tissue Head Neck Thyroid;  Future    Screening for diabetes mellitus  -     Hemoglobin A1c; Future    Class 2 severe obesity due to excess calories with serious comorbidity and body mass index (BMI) of 37.0 to 37.9 in adult  Counseled extensively on need for diet changes and daily exercise.  Discussed examples of healthy eating.  Recommend at least 30 minutes of exercise at least 5 days a week.    Target goal is to get to 240 lbs by her 4 mo f/u c me  -     Hemoglobin A1c; Future  -     TSH; Future  -     Ambulatory Referral to Bariatric Medicine    Mid back pain on left side  Mild ttp on exam, suspect pulled muscle.  rec otc aleve prn, heating pad, and stretching exercises  Ok for prn flexeril, 10 pills to last 4 mos.  -     cyclobenzaprine (FLEXERIL) 5 MG tablet; Take 1 tablet (5 mg total) by mouth nightly as needed for Muscle spasms.      HM as above  RTC in 4 mos, sooner if needed.  Labs today.  Pebbles Bernal MD  Department of Internal Medicine - [facility]  01/20/2020

## 2020-01-22 ENCOUNTER — HOSPITAL ENCOUNTER (OUTPATIENT)
Dept: RADIOLOGY | Facility: HOSPITAL | Age: 40
Discharge: HOME | End: 2020-01-22
Attending: INTERNAL MEDICINE
Payer: COMMERCIAL

## 2020-01-22 DIAGNOSIS — E07.89 THYROID FULLNESS: ICD-10-CM

## 2020-01-22 PROCEDURE — 76536 US SOFT TISSUE HEAD NECK THYROID: ICD-10-PCS | Mod: 26,,, | Performed by: RADIOLOGY

## 2020-01-22 PROCEDURE — 76536 US EXAM OF HEAD AND NECK: CPT | Mod: TC

## 2020-01-22 PROCEDURE — 76536 US EXAM OF HEAD AND NECK: CPT | Mod: 26,,, | Performed by: RADIOLOGY

## 2020-03-03 ENCOUNTER — PATIENT OUTREACH (OUTPATIENT)
Dept: ADMINISTRATIVE | Facility: OTHER | Age: 40
End: 2020-03-03

## 2020-03-27 ENCOUNTER — TELEPHONE (OUTPATIENT)
Dept: INTERNAL MEDICINE | Facility: CLINIC | Age: 40
End: 2020-03-27

## 2020-03-27 NOTE — TELEPHONE ENCOUNTER
----- Message from Joaquin Aguilar sent at 3/27/2020 11:54 AM CDT -----  Contact: Self [phone]  Would like to get medical advice.  Symptoms (please be specific):  Headaches/feet swollen  How long has patient had these symptoms:  Thre days  Pharmacy name and phone #:    Any drug allergies (copy from chart):      Would the patient rather a call back or a response via [employer]?:  Call back  Comments:

## 2020-03-27 NOTE — TELEPHONE ENCOUNTER
"Tuesday started with headache. Headache is relieved with taking ibuprofen. Pt stated no SOB. Noticed yesterday that she is having "needle-stick pain" on the left side of her chest. That does not last long---seconds. Pt noticed that she is having swelling in her feet. She is working from home and does not have her foot rest that she usually have. She is also "eating wrong". She is watching the news and feels a little stressed. She does not have a BP monitor at home. Pt is taking amlodipine 5mg daily along with HCTZ 25mg daily.    I advised pt to elevate her legs as much as she can. To reduce salt intake. To try to limit the amount of news she is watching. Advised pt that she should get a BP monitor.       "

## 2020-03-27 NOTE — TELEPHONE ENCOUNTER
Pebbles Bernal MD  Nurse Dolores 11 minutes ago (2:15 PM)      Agree with low na diet and BP check.   Also rec activity as tolerated.    Routing comment

## 2020-04-14 DIAGNOSIS — I10 BENIGN ESSENTIAL HYPERTENSION: ICD-10-CM

## 2020-04-15 RX ORDER — HYDROCHLOROTHIAZIDE 25 MG/1
TABLET ORAL
Qty: 90 TABLET | Refills: 3 | Status: SHIPPED | OUTPATIENT
Start: 2020-04-15 | End: 2020-06-18 | Stop reason: SDUPTHER

## 2020-04-15 RX ORDER — AMLODIPINE BESYLATE 5 MG/1
TABLET ORAL
Qty: 90 TABLET | Refills: 3 | Status: SHIPPED | OUTPATIENT
Start: 2020-04-15 | End: 2020-06-18 | Stop reason: SDUPTHER

## 2020-06-18 DIAGNOSIS — I10 BENIGN ESSENTIAL HYPERTENSION: ICD-10-CM

## 2020-06-18 RX ORDER — HYDROCHLOROTHIAZIDE 25 MG/1
TABLET ORAL
Qty: 90 TABLET | Refills: 3 | Status: SHIPPED | OUTPATIENT
Start: 2020-06-18 | End: 2020-10-15 | Stop reason: SDUPTHER

## 2020-06-18 RX ORDER — AMLODIPINE BESYLATE 5 MG/1
TABLET ORAL
Qty: 90 TABLET | Refills: 3 | Status: SHIPPED | OUTPATIENT
Start: 2020-06-18 | End: 2020-10-15 | Stop reason: SDUPTHER

## 2020-06-18 NOTE — TELEPHONE ENCOUNTER
----- Message from Concepción Garcia sent at 6/18/2020 11:28 AM CDT -----  Regarding: self/[phone]  Requesting an RX refill or new RX.  Is this a refill or new RX:  Refill 1  RX name and strength: hydroCHLOROthiazide (HYDRODIURIL) 25 MG tablet  Directions (copy/paste from chart):    Is this a 30 day or 90 day RX:  90 days  Local pharmacy or mail order pharmacy:  local pharmacy  Pharmacy name and phone # (copy/paste from chart):   [id number] [address] AT [facility] [phone] (Phone)  [phone] (Fax)      Comments:      Requesting an RX refill or new RX.  Is this a refill or new RX: refill 2   RX name and strength: amLODIPine (NORVASC) 5 MG tablet  Directions (copy/paste from chart):    Is this a 30 day or 90 day RX:  90 days  Local pharmacy or mail order pharmacy:  local pharmacy  Pharmacy name and phone # (copy/paste from chart):   [facility] #[id number] [facility] [address] [facility] AT [facility] [phone] (Phone)  [phone] (Fax)  Comments:   patient is asking for 3 months supplies because insurance issues.

## 2020-09-09 ENCOUNTER — PATIENT OUTREACH (OUTPATIENT)
Dept: ADMINISTRATIVE | Facility: HOSPITAL | Age: 40
End: 2020-09-09

## 2020-09-09 NOTE — PROGRESS NOTES
Health Maintenance Due   Topic Date Due    Hepatitis C Screening  1980    HIV Screening  07/29/1997    Influenza Vaccine (1) 08/01/2020     Chart review completed.

## 2020-09-23 ENCOUNTER — TELEPHONE (OUTPATIENT)
Dept: INTERNAL MEDICINE | Facility: CLINIC | Age: 40
End: 2020-09-23

## 2020-09-23 NOTE — TELEPHONE ENCOUNTER
----- Message from Quynh Lopez sent at 9/23/2020  8:22 AM CDT -----  Pt called to reschedule her appointment [phone]

## 2020-10-14 DIAGNOSIS — I10 BENIGN ESSENTIAL HYPERTENSION: ICD-10-CM

## 2020-10-14 NOTE — TELEPHONE ENCOUNTER
----- Message from Vickie Ray sent at 10/14/2020  4:05 PM CDT -----  Contact: Patient @ [phone]  Good Evening  Patient was tested positive for Covid-19 and would like to know what can she do.    Thank you

## 2020-10-15 RX ORDER — AMLODIPINE BESYLATE 5 MG/1
TABLET ORAL
Qty: 90 TABLET | Refills: 3 | Status: SHIPPED | OUTPATIENT
Start: 2020-10-15 | End: 2021-10-04

## 2020-10-15 RX ORDER — HYDROCHLOROTHIAZIDE 25 MG/1
TABLET ORAL
Qty: 90 TABLET | Refills: 3 | Status: SHIPPED | OUTPATIENT
Start: 2020-10-15 | End: 2021-10-04

## 2020-10-15 NOTE — TELEPHONE ENCOUNTER
Pebbles Bernal MD  Nurse Dolores 5 minutes ago (12:00 PM)     Tylenol is best c covid.   Let her now to contact us if changes in her sx.

## 2020-10-15 NOTE — TELEPHONE ENCOUNTER
Spoke to pt------- She tested positive yesertday for COVID. She went to the [facility].     She started with a cough and headache. She took some Mucinex DM and have been using Halls cough drops which seem to help. She would like to know which one is safer for her to take Tylenol or Motrin. I did advise her to take plain mucinex instead of DM due to BP.     She was currently at [facility] [facility] and will receive a medication infusion. (she was unsure of the name)

## 2021-07-14 ENCOUNTER — TELEPHONE (OUTPATIENT)
Dept: INTERNAL MEDICINE | Facility: CLINIC | Age: 41
End: 2021-07-14

## 2021-11-29 DIAGNOSIS — I10 BENIGN ESSENTIAL HYPERTENSION: ICD-10-CM

## 2021-11-29 RX ORDER — HYDROCHLOROTHIAZIDE 25 MG/1
TABLET ORAL
Qty: 90 TABLET | Refills: 0 | Status: SHIPPED | OUTPATIENT
Start: 2021-11-29 | End: 2022-03-22 | Stop reason: SDUPTHER

## 2021-11-29 RX ORDER — AMLODIPINE BESYLATE 5 MG/1
TABLET ORAL
Qty: 90 TABLET | Refills: 0 | Status: SHIPPED | OUTPATIENT
Start: 2021-11-29 | End: 2022-03-22 | Stop reason: SDUPTHER

## 2022-03-08 ENCOUNTER — PATIENT OUTREACH (OUTPATIENT)
Dept: ADMINISTRATIVE | Facility: HOSPITAL | Age: 42
End: 2022-03-08

## 2022-03-08 NOTE — PROGRESS NOTES
Health Maintenance Due   Topic Date Due    Hepatitis C Screening  Never done    Cervical Cancer Screening  Never done    HIV Screening  Never done    Influenza Vaccine (1) Never done    COVID-19 Vaccine (3 - Booster for Pfizer series) 02/15/2022     Triggered LINKS & reconciled immunizations.  Updated Care Everywhere.  Checked for outside lab results in [facility].  Chart review completed.

## 2022-03-22 ENCOUNTER — OFFICE VISIT (OUTPATIENT)
Dept: INTERNAL MEDICINE | Facility: CLINIC | Age: 42
End: 2022-03-22

## 2022-03-22 ENCOUNTER — HOSPITAL ENCOUNTER (OUTPATIENT)
Dept: RADIOLOGY | Facility: HOSPITAL | Age: 42
Discharge: HOME | End: 2022-03-22
Attending: INTERNAL MEDICINE

## 2022-03-22 ENCOUNTER — LAB VISIT (OUTPATIENT)
Dept: LAB | Facility: HOSPITAL | Age: 42
End: 2022-03-22
Attending: INTERNAL MEDICINE

## 2022-03-22 VITALS
OXYGEN SATURATION: 96 % | DIASTOLIC BLOOD PRESSURE: 88 MMHG | WEIGHT: 258.81 LBS | SYSTOLIC BLOOD PRESSURE: 134 MMHG | HEIGHT: 67 IN | HEART RATE: 83 BPM | BODY MASS INDEX: 40.62 KG/M2

## 2022-03-22 DIAGNOSIS — R76.8 HEPATITIS C ANTIBODY POSITIVE IN BLOOD: ICD-10-CM

## 2022-03-22 DIAGNOSIS — M54.9 MID BACK PAIN ON LEFT SIDE: ICD-10-CM

## 2022-03-22 DIAGNOSIS — M25.512 CHRONIC LEFT SHOULDER PAIN: ICD-10-CM

## 2022-03-22 DIAGNOSIS — Z11.3 SCREEN FOR STD (SEXUALLY TRANSMITTED DISEASE): ICD-10-CM

## 2022-03-22 DIAGNOSIS — Z11.4 SCREENING FOR HIV (HUMAN IMMUNODEFICIENCY VIRUS): ICD-10-CM

## 2022-03-22 DIAGNOSIS — Z13.0 SCREENING, ANEMIA, DEFICIENCY, IRON: ICD-10-CM

## 2022-03-22 DIAGNOSIS — I10 BENIGN ESSENTIAL HYPERTENSION: Primary | ICD-10-CM

## 2022-03-22 DIAGNOSIS — Z13.1 SCREENING FOR DIABETES MELLITUS: ICD-10-CM

## 2022-03-22 DIAGNOSIS — I10 BENIGN ESSENTIAL HYPERTENSION: ICD-10-CM

## 2022-03-22 DIAGNOSIS — Z12.31 SCREENING MAMMOGRAM, ENCOUNTER FOR: ICD-10-CM

## 2022-03-22 DIAGNOSIS — Z13.220 LIPID SCREENING: ICD-10-CM

## 2022-03-22 DIAGNOSIS — G89.29 CHRONIC LEFT SHOULDER PAIN: ICD-10-CM

## 2022-03-22 LAB
ALBUMIN SERPL BCP-MCNC: 3.7 G/DL (ref 3.5–5.2)
ALP SERPL-CCNC: 87 U/L (ref 55–135)
ALT SERPL W/O P-5'-P-CCNC: 16 U/L (ref 10–44)
ANION GAP SERPL CALC-SCNC: 9 MMOL/L (ref 8–16)
AST SERPL-CCNC: 21 U/L (ref 10–40)
BASOPHILS # BLD AUTO: 0.04 K/UL (ref 0–0.2)
BASOPHILS NFR BLD: 0.6 % (ref 0–1.9)
BILIRUB SERPL-MCNC: 1 MG/DL (ref 0.1–1)
BILIRUB UR QL STRIP: NEGATIVE
BUN SERPL-MCNC: 10 MG/DL (ref 6–20)
CALCIUM SERPL-MCNC: 9.7 MG/DL (ref 8.7–10.5)
CHLORIDE SERPL-SCNC: 105 MMOL/L (ref 95–110)
CHOLEST SERPL-MCNC: 196 MG/DL (ref 120–199)
CHOLEST/HDLC SERPL: 3.7 {RATIO} (ref 2–5)
CLARITY UR REFRACT.AUTO: ABNORMAL
CO2 SERPL-SCNC: 25 MMOL/L (ref 23–29)
COLOR UR AUTO: YELLOW
CREAT SERPL-MCNC: 0.8 MG/DL (ref 0.5–1.4)
DIFFERENTIAL METHOD: NORMAL
EOSINOPHIL # BLD AUTO: 0.1 K/UL (ref 0–0.5)
EOSINOPHIL NFR BLD: 0.9 % (ref 0–8)
ERYTHROCYTE [DISTWIDTH] IN BLOOD BY AUTOMATED COUNT: 13.3 % (ref 11.5–14.5)
EST. GFR  (AFRICAN AMERICAN): >60 ML/MIN/1.73 M^2
EST. GFR  (NON AFRICAN AMERICAN): >60 ML/MIN/1.73 M^2
ESTIMATED AVG GLUCOSE: 105 MG/DL (ref 68–131)
GLUCOSE SERPL-MCNC: 90 MG/DL (ref 70–110)
GLUCOSE UR QL STRIP: NEGATIVE
HBA1C MFR BLD: 5.3 % (ref 4–5.6)
HCT VFR BLD AUTO: 38.7 % (ref 37–48.5)
HDLC SERPL-MCNC: 53 MG/DL (ref 40–75)
HDLC SERPL: 27 % (ref 20–50)
HGB BLD-MCNC: 12.7 G/DL (ref 12–16)
HGB UR QL STRIP: NEGATIVE
IMM GRANULOCYTES # BLD AUTO: 0.01 K/UL (ref 0–0.04)
IMM GRANULOCYTES NFR BLD AUTO: 0.2 % (ref 0–0.5)
KETONES UR QL STRIP: NEGATIVE
LDLC SERPL CALC-MCNC: 122.4 MG/DL (ref 63–159)
LEUKOCYTE ESTERASE UR QL STRIP: NEGATIVE
LYMPHOCYTES # BLD AUTO: 2.8 K/UL (ref 1–4.8)
LYMPHOCYTES NFR BLD: 44.2 % (ref 18–48)
MCH RBC QN AUTO: 27.9 PG (ref 27–31)
MCHC RBC AUTO-ENTMCNC: 32.8 G/DL (ref 32–36)
MCV RBC AUTO: 85 FL (ref 82–98)
MONOCYTES # BLD AUTO: 0.5 K/UL (ref 0.3–1)
MONOCYTES NFR BLD: 7 % (ref 4–15)
NEUTROPHILS # BLD AUTO: 3 K/UL (ref 1.8–7.7)
NEUTROPHILS NFR BLD: 47.1 % (ref 38–73)
NITRITE UR QL STRIP: NEGATIVE
NONHDLC SERPL-MCNC: 143 MG/DL
NRBC BLD-RTO: 0 /100 WBC
PH UR STRIP: 5 [PH] (ref 5–8)
PLATELET # BLD AUTO: 290 K/UL (ref 150–450)
PMV BLD AUTO: 11.3 FL (ref 9.2–12.9)
POTASSIUM SERPL-SCNC: 3.5 MMOL/L (ref 3.5–5.1)
PROT SERPL-MCNC: 8 G/DL (ref 6–8.4)
PROT UR QL STRIP: NEGATIVE
RBC # BLD AUTO: 4.56 M/UL (ref 4–5.4)
SODIUM SERPL-SCNC: 139 MMOL/L (ref 136–145)
SP GR UR STRIP: 1.01 (ref 1–1.03)
TRIGL SERPL-MCNC: 103 MG/DL (ref 30–150)
URN SPEC COLLECT METH UR: ABNORMAL
WBC # BLD AUTO: 6.42 K/UL (ref 3.9–12.7)

## 2022-03-22 PROCEDURE — 87389 HIV-1 AG W/HIV-1&-2 AB AG IA: CPT | Performed by: INTERNAL MEDICINE

## 2022-03-22 PROCEDURE — 86592 SYPHILIS TEST NON-TREP QUAL: CPT | Performed by: INTERNAL MEDICINE

## 2022-03-22 PROCEDURE — 36415 COLL VENOUS BLD VENIPUNCTURE: CPT | Performed by: INTERNAL MEDICINE

## 2022-03-22 PROCEDURE — 80074 ACUTE HEPATITIS PANEL: CPT | Performed by: INTERNAL MEDICINE

## 2022-03-22 PROCEDURE — 73030 X-RAY EXAM OF SHOULDER: CPT | Mod: TC,LT

## 2022-03-22 PROCEDURE — 99214 PR OFFICE/OUTPT VISIT, EST, LEVL IV, 30-39 MIN: ICD-10-PCS | Mod: S$PBB,,, | Performed by: INTERNAL MEDICINE

## 2022-03-22 PROCEDURE — 81003 URINALYSIS AUTO W/O SCOPE: CPT | Performed by: INTERNAL MEDICINE

## 2022-03-22 PROCEDURE — 80061 LIPID PANEL: CPT | Performed by: INTERNAL MEDICINE

## 2022-03-22 PROCEDURE — 85025 COMPLETE CBC W/AUTO DIFF WBC: CPT | Performed by: INTERNAL MEDICINE

## 2022-03-22 PROCEDURE — 99215 OFFICE O/P EST HI 40 MIN: CPT | Mod: PBBFAC | Performed by: INTERNAL MEDICINE

## 2022-03-22 PROCEDURE — 83036 HEMOGLOBIN GLYCOSYLATED A1C: CPT | Performed by: INTERNAL MEDICINE

## 2022-03-22 PROCEDURE — 87522 HEPATITIS C REVRS TRNSCRPJ: CPT | Performed by: INTERNAL MEDICINE

## 2022-03-22 PROCEDURE — 99999 PR PBB SHADOW E&M-EST. PATIENT-LVL V: CPT | Mod: PBBFAC,,, | Performed by: INTERNAL MEDICINE

## 2022-03-22 PROCEDURE — 87491 CHLMYD TRACH DNA AMP PROBE: CPT | Performed by: INTERNAL MEDICINE

## 2022-03-22 PROCEDURE — 73030 X-RAY EXAM OF SHOULDER: CPT | Mod: 26,LT,, | Performed by: RADIOLOGY

## 2022-03-22 PROCEDURE — 99999 PR PBB SHADOW E&M-EST. PATIENT-LVL V: ICD-10-PCS | Mod: PBBFAC,,, | Performed by: INTERNAL MEDICINE

## 2022-03-22 PROCEDURE — 80053 COMPREHEN METABOLIC PANEL: CPT | Performed by: INTERNAL MEDICINE

## 2022-03-22 PROCEDURE — 73030 XR SHOULDER COMPLETE 2 OR MORE VIEWS LEFT: ICD-10-PCS | Mod: 26,LT,, | Performed by: RADIOLOGY

## 2022-03-22 PROCEDURE — 87591 N.GONORRHOEAE DNA AMP PROB: CPT | Performed by: INTERNAL MEDICINE

## 2022-03-22 PROCEDURE — 99214 OFFICE O/P EST MOD 30 MIN: CPT | Mod: S$PBB,,, | Performed by: INTERNAL MEDICINE

## 2022-03-22 RX ORDER — AMLODIPINE BESYLATE 5 MG/1
TABLET ORAL
Qty: 90 TABLET | Refills: 3 | Status: SHIPPED | OUTPATIENT
Start: 2022-03-22 | End: 2023-06-19

## 2022-03-22 RX ORDER — CYCLOBENZAPRINE HCL 5 MG
5 TABLET ORAL NIGHTLY PRN
Qty: 10 TABLET | Refills: 0 | Status: SHIPPED | OUTPATIENT
Start: 2022-03-22 | End: 2022-10-08 | Stop reason: SINTOL

## 2022-03-22 RX ORDER — HYDROCHLOROTHIAZIDE 25 MG/1
TABLET ORAL
Qty: 90 TABLET | Refills: 3 | Status: SHIPPED | OUTPATIENT
Start: 2022-03-22 | End: 2023-06-19

## 2022-03-22 RX ORDER — MELOXICAM 7.5 MG/1
7.5 TABLET ORAL DAILY PRN
Qty: 90 TABLET | Refills: 0 | Status: SHIPPED | OUTPATIENT
Start: 2022-03-22 | End: 2022-10-08

## 2022-03-22 NOTE — PROGRESS NOTES
INTERNAL MEDICINE ESTABLISHED PATIENT VISIT NOTE    Subjective:     Chief Complaint: Annual Exam       Patient ID: Saskia Huang is a 41 y.o. female with HTN, chronic back pain, last seen by me 1/2020, here today for annual exam.    Was previously taking Flexeril prn back pain but states it made her too sleepy and requesting something else to take prn.    Has had difficulty c weight loss.  Today also c/o L shoulder pain, mostly on lateral and posterior aspect of her shoulder.  Also c/o intermittent itchy skin over her breasts but denies rashes or erythema.    Past Medical History:  Past Medical History:   Diagnosis Date    Benign essential hypertension 3/15/2018    Hypertension        Home Medications:  Prior to Admission medications    Medication Sig Start Date End Date Taking? Authorizing Provider   amLODIPine (NORVASC) 5 MG tablet Take 1 tablet by mouth once daily 11/29/21  Yes Pebbles Bernal MD   hydroCHLOROthiazide (HYDRODIURIL) 25 MG tablet Take 1 tablet by mouth once daily 11/29/21  Yes Pebbles Bernal MD   cyclobenzaprine (FLEXERIL) 5 MG tablet Take 1 tablet (5 mg total) by mouth nightly as needed for Muscle spasms.  Patient not taking: Reported on 3/22/2022 1/20/20   Pebbles Bernal MD       Allergies:  Review of patient's allergies indicates:  No Known Allergies    Social History:  Social History     Tobacco Use    Smoking status: Never Smoker    Smokeless tobacco: Never Used   Substance Use Topics    Alcohol use: Yes     Comment: occasional, on holidays    Drug use: No        Review of Systems   Constitutional: Negative for appetite change, chills, fatigue, fever and unexpected weight change.   HENT: Negative for congestion, hearing loss and rhinorrhea.    Eyes: Negative for pain and visual disturbance.   Respiratory: Negative for cough, chest tightness, shortness of breath and wheezing.    Cardiovascular: Negative for chest pain, palpitations and leg swelling.   Gastrointestinal: Negative for abdominal  "distention and abdominal pain.   Endocrine: Negative for polydipsia and polyuria.   Genitourinary: Negative for decreased urine volume, difficulty urinating, dysuria, hematuria and vaginal discharge.   Neurological: Negative for weakness, numbness and headaches.   Psychiatric/Behavioral: Negative for behavioral problems and confusion.         Health Maintenance:     Immunizations:   Influenza rec this Fall  TDap 1/2020  COVID 8/2021, 9/2021     Cancer Screening:  PAP:  [place] [facility] rec routine f/u for [employer]  Mammogram:rec at 42, will schedule for early Aug.  Colonoscopy: rec at 47    Objective:   /88 (BP Location: Left arm, Patient Position: Sitting, BP Method: Large (Manual))   Pulse 83   Ht 5' 7" (1.702 m)   Wt 117.4 kg (258 lb 13.1 oz)   LMP 03/09/2022   SpO2 96%   BMI 40.54 kg/m²        General: AAO x3, no apparent distress  HEENT: PERRL, OP clear  CV: RRR, no m/r/g  Pulm: Lungs CTAB, no crackles, no wheezes  Abd: s/NT/ND +BS  Extremities: no c/c/e  Shoulder: L shoulder c pain c abduction past 45 deg, pain when reaching posteriorly    Labs:     Lab Results   Component Value Date    WBC 5.65 01/20/2020    HGB 12.7 01/20/2020    HCT 37.5 01/20/2020    MCV 84 01/20/2020     01/20/2020     Sodium   Date Value Ref Range Status   01/20/2020 138 136 - 145 mmol/L Final     Potassium   Date Value Ref Range Status   01/20/2020 3.8 3.5 - 5.1 mmol/L Final     Chloride   Date Value Ref Range Status   01/20/2020 103 95 - 110 mmol/L Final     CO2   Date Value Ref Range Status   01/20/2020 28 23 - 29 mmol/L Final     Glucose   Date Value Ref Range Status   01/20/2020 91 70 - 110 mg/dL Final     BUN   Date Value Ref Range Status   01/20/2020 14 6 - 20 mg/dL Final     Creatinine   Date Value Ref Range Status   01/20/2020 0.9 0.5 - 1.4 mg/dL Final     Calcium   Date Value Ref Range Status   01/20/2020 8.7 8.7 - 10.5 mg/dL Final     Total Protein   Date Value Ref Range Status   01/20/2020 7.7 6.0 - 8.4 g/dL " Final     Albumin   Date Value Ref Range Status   01/20/2020 3.6 3.5 - 5.2 g/dL Final     Total Bilirubin   Date Value Ref Range Status   01/20/2020 1.1 (H) 0.1 - 1.0 mg/dL Final     Comment:     For infants and newborns, interpretation of results should be based  on gestational age, weight and in agreement with clinical  observations.  Premature Infant recommended reference ranges:  Up to 24 hours.............<8.0 mg/dL  Up to 48 hours............<12.0 mg/dL  3-5 days..................<15.0 mg/dL  6-29 days.................<15.0 mg/dL       Alkaline Phosphatase   Date Value Ref Range Status   01/20/2020 88 55 - 135 U/L Final     AST   Date Value Ref Range Status   01/20/2020 18 10 - 40 U/L Final     ALT   Date Value Ref Range Status   01/20/2020 13 10 - 44 U/L Final     Anion Gap   Date Value Ref Range Status   01/20/2020 7 (L) 8 - 16 mmol/L Final     eGFR if    Date Value Ref Range Status   01/20/2020 >60 >60 mL/min/1.73 m^2 Final     eGFR if non    Date Value Ref Range Status   01/20/2020 >60 >60 mL/min/1.73 m^2 Final     Comment:     Calculation used to obtain the estimated glomerular filtration  rate (eGFR) is the CKD-EPI equation.        Lab Results   Component Value Date    HGBA1C 5.3 01/20/2020     Lab Results   Component Value Date    LDLCALC 110.4 01/20/2020     Lab Results   Component Value Date    TSH 1.325 01/20/2020         Assessment/Plan     Saskia was seen today for annual exam.    Diagnoses and all orders for this visit:    Benign essential hypertension  At goal  Needing refills, will send  Check labs.  -     hydroCHLOROthiazide (HYDRODIURIL) 25 MG tablet; Take 1 tablet by mouth once daily  -     amLODIPine (NORVASC) 5 MG tablet; Take 1 tablet by mouth once daily  -     Comprehensive Metabolic Panel; Future  -     Urinalysis    Mid back pain on left side  Chronic, intermittent  Can cont prn flexeril at night  Will add mobic for as needed use during the day  -      cyclobenzaprine (FLEXERIL) 5 MG tablet; Take 1 tablet (5 mg total) by mouth nightly as needed for Muscle spasms.  -     meloxicam (MOBIC) 7.5 MG tablet; Take 1 tablet (7.5 mg total) by mouth daily as needed for Pain.    Chronic left shoulder pain  As per HPI, hx fall, will send for xrays  -     X-Ray Shoulder 2 or More Views Left; Future  -     Ambulatory referral/consult to Orthopedics; Future    Screening, anemia, deficiency, iron  -     CBC Auto Differential; Future    Screening for diabetes mellitus  -     Hemoglobin A1C; Future    Lipid screening  -     Lipid Panel; Future    Screening for HIV (human immunodeficiency virus)  -     HIV 1/2 Ag/Ab (4th Gen); Future    Hepatitis C antibody positive in blood  -     Hepatitis Panel, Acute; Future  -     HEPATITIS C RNA, QUANTITATIVE, PCR; Future    Screen for STD (sexually transmitted disease)  -     C. trachomatis/N. gonorrhoeae by AMP DNA  -     RPR; Future    Screening mammogram, encounter for  -     Mammo Digital Screening Bilat w/ Adam; Future      HM as above  RTC in 6 mos, sooner if needed.  Labs today.    Pebbles Bernal MD  Department of Internal Medicine - [facility]  03/22/2022

## 2022-03-23 LAB
C TRACH DNA SPEC QL NAA+PROBE: NOT DETECTED
HAV IGM SERPL QL IA: NEGATIVE
HBV CORE IGM SERPL QL IA: NEGATIVE
HBV SURFACE AG SERPL QL IA: NEGATIVE
HCV AB SERPL QL IA: NEGATIVE
HIV 1+2 AB+HIV1 P24 AG SERPL QL IA: NEGATIVE
N GONORRHOEA DNA SPEC QL NAA+PROBE: NOT DETECTED
RPR SER QL: NORMAL

## 2022-03-25 LAB — HEPATITIS C VIRUS (HCV) RNA DETECTION/QUANTIFICATION RT-PCR: NORMAL IU/ML

## 2022-05-30 ENCOUNTER — PATIENT MESSAGE (OUTPATIENT)
Dept: ADMINISTRATIVE | Facility: HOSPITAL | Age: 42
End: 2022-05-30

## 2022-07-11 ENCOUNTER — PATIENT MESSAGE (OUTPATIENT)
Dept: ADMINISTRATIVE | Facility: HOSPITAL | Age: 42
End: 2022-07-11

## 2022-08-24 ENCOUNTER — PATIENT MESSAGE (OUTPATIENT)
Dept: ADMINISTRATIVE | Facility: HOSPITAL | Age: 42
End: 2022-08-24

## 2022-10-08 ENCOUNTER — OFFICE VISIT (OUTPATIENT)
Dept: INTERNAL MEDICINE | Facility: CLINIC | Age: 42
End: 2022-10-08

## 2022-10-08 ENCOUNTER — LAB VISIT (OUTPATIENT)
Dept: LAB | Facility: HOSPITAL | Age: 42
End: 2022-10-08
Attending: INTERNAL MEDICINE

## 2022-10-08 VITALS
WEIGHT: 253.5 LBS | BODY MASS INDEX: 39.79 KG/M2 | DIASTOLIC BLOOD PRESSURE: 82 MMHG | HEIGHT: 67 IN | SYSTOLIC BLOOD PRESSURE: 134 MMHG | HEART RATE: 77 BPM | OXYGEN SATURATION: 98 %

## 2022-10-08 DIAGNOSIS — M62.830 BACK SPASM: ICD-10-CM

## 2022-10-08 DIAGNOSIS — R68.2 DRY MOUTH: ICD-10-CM

## 2022-10-08 DIAGNOSIS — B35.9 RINGWORM: ICD-10-CM

## 2022-10-08 DIAGNOSIS — L30.4 INTERTRIGO: ICD-10-CM

## 2022-10-08 DIAGNOSIS — I10 BENIGN ESSENTIAL HYPERTENSION: ICD-10-CM

## 2022-10-08 DIAGNOSIS — I10 BENIGN ESSENTIAL HYPERTENSION: Primary | ICD-10-CM

## 2022-10-08 DIAGNOSIS — R07.89 GASSY CHEST PAIN: ICD-10-CM

## 2022-10-08 LAB
25(OH)D3+25(OH)D2 SERPL-MCNC: 13 NG/ML (ref 30–96)
ALBUMIN SERPL BCP-MCNC: 3.3 G/DL (ref 3.5–5.2)
ALP SERPL-CCNC: 86 U/L (ref 55–135)
ALT SERPL W/O P-5'-P-CCNC: 14 U/L (ref 10–44)
ANION GAP SERPL CALC-SCNC: 10 MMOL/L (ref 8–16)
AST SERPL-CCNC: 18 U/L (ref 10–40)
BILIRUB SERPL-MCNC: 0.7 MG/DL (ref 0.1–1)
BUN SERPL-MCNC: 11 MG/DL (ref 6–20)
CALCIUM SERPL-MCNC: 8.7 MG/DL (ref 8.7–10.5)
CHLORIDE SERPL-SCNC: 107 MMOL/L (ref 95–110)
CO2 SERPL-SCNC: 22 MMOL/L (ref 23–29)
CREAT SERPL-MCNC: 0.8 MG/DL (ref 0.5–1.4)
EST. GFR  (NO RACE VARIABLE): >60 ML/MIN/1.73 M^2
GLUCOSE SERPL-MCNC: 89 MG/DL (ref 70–110)
POTASSIUM SERPL-SCNC: 3.6 MMOL/L (ref 3.5–5.1)
PROT SERPL-MCNC: 7 G/DL (ref 6–8.4)
SODIUM SERPL-SCNC: 139 MMOL/L (ref 136–145)

## 2022-10-08 PROCEDURE — 99999 PR PBB SHADOW E&M-EST. PATIENT-LVL III: ICD-10-PCS | Mod: PBBFAC,,, | Performed by: INTERNAL MEDICINE

## 2022-10-08 PROCEDURE — 99214 OFFICE O/P EST MOD 30 MIN: CPT | Mod: S$PBB,,, | Performed by: INTERNAL MEDICINE

## 2022-10-08 PROCEDURE — 36415 COLL VENOUS BLD VENIPUNCTURE: CPT | Performed by: INTERNAL MEDICINE

## 2022-10-08 PROCEDURE — 99214 PR OFFICE/OUTPT VISIT, EST, LEVL IV, 30-39 MIN: ICD-10-PCS | Mod: S$PBB,,, | Performed by: INTERNAL MEDICINE

## 2022-10-08 PROCEDURE — 99999 PR PBB SHADOW E&M-EST. PATIENT-LVL III: CPT | Mod: PBBFAC,,, | Performed by: INTERNAL MEDICINE

## 2022-10-08 PROCEDURE — 99213 OFFICE O/P EST LOW 20 MIN: CPT | Mod: PBBFAC | Performed by: INTERNAL MEDICINE

## 2022-10-08 PROCEDURE — 80053 COMPREHEN METABOLIC PANEL: CPT | Performed by: INTERNAL MEDICINE

## 2022-10-08 PROCEDURE — 82306 VITAMIN D 25 HYDROXY: CPT | Performed by: INTERNAL MEDICINE

## 2022-10-08 RX ORDER — CLOTRIMAZOLE 1 %
CREAM (GRAM) TOPICAL 2 TIMES DAILY
Qty: 45 G | Refills: 0 | Status: SHIPPED | OUTPATIENT
Start: 2022-10-08 | End: 2023-03-09

## 2022-10-08 RX ORDER — METHOCARBAMOL 500 MG/1
500 TABLET, FILM COATED ORAL 3 TIMES DAILY
Qty: 30 TABLET | Refills: 0 | Status: SHIPPED | OUTPATIENT
Start: 2022-10-08 | End: 2022-10-18

## 2022-10-08 RX ORDER — NYSTATIN 100000 [USP'U]/G
POWDER TOPICAL 2 TIMES DAILY
Qty: 60 G | Refills: 0 | Status: SHIPPED | OUTPATIENT
Start: 2022-10-08 | End: 2023-10-30 | Stop reason: ALTCHOICE

## 2022-10-08 NOTE — PROGRESS NOTES
INTERNAL MEDICINE ESTABLISHED PATIENT VISIT NOTE    Subjective:     Chief Complaint: digestion and tightness in back  and Follow-up       Patient ID: Saskia Huang is a 42 y.o. female with HTN, chronic back pain, last seen by me in March for her annual, here today for 6 mo f/u.    States she had an episode last Monday where she felt like her food was moving in her body from her esophagus across the R side of her chest.  Denies heartburn sx or reflux.    Also c/o lower back spasms.  Rx'ed Flexeril previously which she states she has not been taking due to drowsiness on meds.    Also c/o mouth feeling dry at times.  Also c/o rash on her L breast near the nipple and underneath her breasts B.      Past Medical History:  Past Medical History:   Diagnosis Date    Benign essential hypertension 3/15/2018    Hypertension        Home Medications:  Prior to Admission medications    Medication Sig Start Date End Date Taking? Authorizing Provider   amLODIPine (NORVASC) 5 MG tablet Take 1 tablet by mouth once daily 3/22/22   Pebbles Bernal MD   cyclobenzaprine (FLEXERIL) 5 MG tablet Take 1 tablet (5 mg total) by mouth nightly as needed for Muscle spasms. 3/22/22   Pebbles Bernal MD   hydroCHLOROthiazide (HYDRODIURIL) 25 MG tablet Take 1 tablet by mouth once daily 3/22/22   Pebbles Bernal MD   meloxicam (MOBIC) 7.5 MG tablet Take 1 tablet (7.5 mg total) by mouth daily as needed for Pain. 3/22/22   Pebbles Bernal MD       Allergies:  Review of patient's allergies indicates:  No Known Allergies    Social History:  Social History     Tobacco Use    Smoking status: Never    Smokeless tobacco: Never   Substance Use Topics    Alcohol use: Yes     Comment: occasional, on holidays    Drug use: No        Review of Systems   Constitutional:  Negative for chills, fatigue and fever.   Respiratory:  Negative for cough, chest tightness and shortness of breath.    Cardiovascular:  Negative for chest pain.   Gastrointestinal:  Negative for abdominal pain  "and blood in stool.   Genitourinary:  Negative for dysuria and frequency.       Health Maintenance:     Immunizations:   Influenza rec today, can get at her local pharmacy.  TDap 1/2020  COVID 8/2021, 9/2021, rec schedule Omicron booster.     Cancer Screening:  [place] [facility] [place]u for [employer]  Mammogram: rec at 42, ordered at last visit, will schedule now.  Colonoscopy: rec at 47    Objective:   /82 (BP Location: Right arm, Patient Position: Sitting, BP Method: Large (Manual))   Pulse 77   Ht 5' 7" (1.702 m)   Wt 115 kg (253 lb 8.5 oz)   SpO2 98%   BMI 39.71 kg/m²        General: AAO x3, no apparent distress  HEENT: PERRL, OP clear  CV: RRR, no m/r/g  Pulm: Lungs CTAB, no crackles, no wheezes  Abd: s/NT/ND +BS  Extremities: no c/c/e  Skin: scaling circular hyperpigmentation on medial aspect of L areola on breast, also c moist appearing hyperpigmentation under L breast.    Labs:     Lab Results   Component Value Date    WBC 6.42 03/22/2022    HGB 12.7 03/22/2022    HCT 38.7 03/22/2022    MCV 85 03/22/2022     03/22/2022     Sodium   Date Value Ref Range Status   03/22/2022 139 136 - 145 mmol/L Final     Potassium   Date Value Ref Range Status   03/22/2022 3.5 3.5 - 5.1 mmol/L Final     Chloride   Date Value Ref Range Status   03/22/2022 105 95 - 110 mmol/L Final     CO2   Date Value Ref Range Status   03/22/2022 25 23 - 29 mmol/L Final     Glucose   Date Value Ref Range Status   03/22/2022 90 70 - 110 mg/dL Final     BUN   Date Value Ref Range Status   03/22/2022 10 6 - 20 mg/dL Final     Creatinine   Date Value Ref Range Status   03/22/2022 0.8 0.5 - 1.4 mg/dL Final     Calcium   Date Value Ref Range Status   03/22/2022 9.7 8.7 - 10.5 mg/dL Final     Total Protein   Date Value Ref Range Status   03/22/2022 8.0 6.0 - 8.4 g/dL Final     Albumin   Date Value Ref Range Status   03/22/2022 3.7 3.5 - 5.2 g/dL Final     Total Bilirubin   Date Value Ref Range Status   03/22/2022 1.0 0.1 - " 1.0 mg/dL Final     Comment:     For infants and newborns, interpretation of results should be based  on gestational age, weight and in agreement with clinical  observations.    Premature Infant recommended reference ranges:  Up to 24 hours.............<8.0 mg/dL  Up to 48 hours............<12.0 mg/dL  3-5 days..................<15.0 mg/dL  6-29 days.................<15.0 mg/dL       Alkaline Phosphatase   Date Value Ref Range Status   03/22/2022 87 55 - 135 U/L Final     AST   Date Value Ref Range Status   03/22/2022 21 10 - 40 U/L Final     ALT   Date Value Ref Range Status   03/22/2022 16 10 - 44 U/L Final     Anion Gap   Date Value Ref Range Status   03/22/2022 9 8 - 16 mmol/L Final     eGFR if    Date Value Ref Range Status   03/22/2022 >60.0 >60 mL/min/1.73 m^2 Final     eGFR if non    Date Value Ref Range Status   03/22/2022 >60.0 >60 mL/min/1.73 m^2 Final     Comment:     Calculation used to obtain the estimated glomerular filtration  rate (eGFR) is the CKD-EPI equation.        Lab Results   Component Value Date    HGBA1C 5.3 03/22/2022     Lab Results   Component Value Date    LDLCALC 122.4 03/22/2022     Lab Results   Component Value Date    TSH 1.325 01/20/2020         Assessment/Plan     Saskia was seen today for digestion and tightness in back  and follow-up.    Diagnoses and all orders for this visit:    Benign essential hypertension  At goal on current regimen  Cont meds  -     Comprehensive Metabolic Panel; Future    Back spasm  As per HPI  Drowsiness on flexeril, okay to change to Robaxin.  Has not been taking Mobic, can take over the counter Aleve prn.  Discussed wt loss, heating pad, massage, and stretching exercises prn  Will also check vit D levels.  -     methocarbamoL (ROBAXIN) 500 MG Tab; Take 1 tablet (500 mg total) by mouth 3 (three) times daily. for 10 days  -     Vitamin D; Future    Ringworm  Noted on L breast, will tx  Also due for mmg, will schedule  at checkout  -     clotrimazole (LOTRIMIN) 1 % cream; Apply topically 2 (two) times daily.    Intertrigo  -     nystatin (MYCOSTATIN) powder; Apply topically 2 (two) times daily.    Gassy chest pain  Given reassurance, recurrent sx since that isolated episode last week.    Dry mouth  HCTZ likely contributing, encouraged increased hydration.  Biotene mouth wash may also help.  Lytes today.    HM as above  RTC in 6 mos, sooner if needed.  Labs today.    Pebbles Bernal MD  Department of Internal Medicine - [facility]  10/08/2022

## 2022-10-09 DIAGNOSIS — E55.9 VITAMIN D DEFICIENCY: Primary | ICD-10-CM

## 2022-10-09 RX ORDER — ERGOCALCIFEROL 1.25 MG/1
50000 CAPSULE ORAL
Qty: 12 CAPSULE | Refills: 3 | Status: SHIPPED | OUTPATIENT
Start: 2022-10-09 | End: 2023-03-09

## 2022-10-10 ENCOUNTER — PATIENT MESSAGE (OUTPATIENT)
Dept: ADMINISTRATIVE | Facility: HOSPITAL | Age: 42
End: 2022-10-10

## 2023-01-25 ENCOUNTER — TELEPHONE (OUTPATIENT)
Dept: INTERNAL MEDICINE | Facility: CLINIC | Age: 43
End: 2023-01-25
Payer: MEDICAID

## 2023-01-25 NOTE — TELEPHONE ENCOUNTER
----- Message from Kayden Caputo sent at 1/25/2023  4:09 PM CST -----  Contact: self [phone]  Est medicaid pt requesting a call to schedule annual.    Please call and advise

## 2023-02-02 ENCOUNTER — HOSPITAL ENCOUNTER (OUTPATIENT)
Dept: RADIOLOGY | Facility: HOSPITAL | Age: 43
Discharge: HOME | End: 2023-02-02
Attending: INTERNAL MEDICINE
Payer: MEDICAID

## 2023-02-02 DIAGNOSIS — Z12.31 SCREENING MAMMOGRAM, ENCOUNTER FOR: ICD-10-CM

## 2023-02-02 PROCEDURE — 77067 SCR MAMMO BI INCL CAD: CPT | Mod: 26,,, | Performed by: RADIOLOGY

## 2023-02-02 PROCEDURE — 77067 MAMMO DIGITAL SCREENING BILAT WITH TOMO: ICD-10-PCS | Mod: 26,,, | Performed by: RADIOLOGY

## 2023-02-02 PROCEDURE — 77063 MAMMO DIGITAL SCREENING BILAT WITH TOMO: ICD-10-PCS | Mod: 26,,, | Performed by: RADIOLOGY

## 2023-02-02 PROCEDURE — 77063 BREAST TOMOSYNTHESIS BI: CPT | Mod: 26,,, | Performed by: RADIOLOGY

## 2023-02-02 PROCEDURE — 77067 SCR MAMMO BI INCL CAD: CPT | Mod: TC

## 2023-02-07 ENCOUNTER — TELEPHONE (OUTPATIENT)
Dept: INTERNAL MEDICINE | Facility: CLINIC | Age: 43
End: 2023-02-07
Payer: MEDICAID

## 2023-02-07 NOTE — TELEPHONE ENCOUNTER
Spoke to patient and advised of mammo results and recommendation. Patient verbalized understanding.

## 2023-02-07 NOTE — TELEPHONE ENCOUNTER
----- Message from Pebbles Bernal MD sent at 2/6/2023  5:14 PM CST -----  Mammogram normal.  Nursing to notify patient.  Repeat mammogram in one year.

## 2023-03-09 ENCOUNTER — OFFICE VISIT (OUTPATIENT)
Dept: INTERNAL MEDICINE | Facility: CLINIC | Age: 43
End: 2023-03-09
Payer: MEDICAID

## 2023-03-09 ENCOUNTER — TELEPHONE (OUTPATIENT)
Dept: INTERNAL MEDICINE | Facility: CLINIC | Age: 43
End: 2023-03-09

## 2023-03-09 ENCOUNTER — LAB VISIT (OUTPATIENT)
Dept: LAB | Facility: HOSPITAL | Age: 43
End: 2023-03-09
Attending: INTERNAL MEDICINE
Payer: MEDICAID

## 2023-03-09 VITALS
DIASTOLIC BLOOD PRESSURE: 78 MMHG | HEART RATE: 70 BPM | SYSTOLIC BLOOD PRESSURE: 126 MMHG | OXYGEN SATURATION: 99 % | BODY MASS INDEX: 40.42 KG/M2 | WEIGHT: 257.5 LBS | HEIGHT: 67 IN

## 2023-03-09 DIAGNOSIS — Z13.220 LIPID SCREENING: ICD-10-CM

## 2023-03-09 DIAGNOSIS — E66.01 CLASS 3 SEVERE OBESITY DUE TO EXCESS CALORIES WITH SERIOUS COMORBIDITY AND BODY MASS INDEX (BMI) OF 40.0 TO 44.9 IN ADULT: ICD-10-CM

## 2023-03-09 DIAGNOSIS — L60.9 NAIL ABNORMALITIES: ICD-10-CM

## 2023-03-09 DIAGNOSIS — I10 BENIGN ESSENTIAL HYPERTENSION: Primary | ICD-10-CM

## 2023-03-09 DIAGNOSIS — Z13.1 SCREENING FOR DIABETES MELLITUS: ICD-10-CM

## 2023-03-09 DIAGNOSIS — Z13.0 SCREENING, ANEMIA, DEFICIENCY, IRON: ICD-10-CM

## 2023-03-09 DIAGNOSIS — L30.9 ECZEMA, UNSPECIFIED TYPE: ICD-10-CM

## 2023-03-09 DIAGNOSIS — G89.29 CHRONIC BILATERAL LOW BACK PAIN WITHOUT SCIATICA: ICD-10-CM

## 2023-03-09 DIAGNOSIS — M54.50 CHRONIC BILATERAL LOW BACK PAIN WITHOUT SCIATICA: ICD-10-CM

## 2023-03-09 DIAGNOSIS — I10 BENIGN ESSENTIAL HYPERTENSION: ICD-10-CM

## 2023-03-09 DIAGNOSIS — E55.9 VITAMIN D DEFICIENCY: ICD-10-CM

## 2023-03-09 PROBLEM — E66.813 CLASS 3 SEVERE OBESITY DUE TO EXCESS CALORIES WITH SERIOUS COMORBIDITY AND BODY MASS INDEX (BMI) OF 40.0 TO 44.9 IN ADULT: Status: ACTIVE | Noted: 2023-03-09

## 2023-03-09 LAB
ALBUMIN SERPL BCP-MCNC: 3.7 G/DL (ref 3.5–5.2)
ALP SERPL-CCNC: 89 U/L (ref 55–135)
ALT SERPL W/O P-5'-P-CCNC: 11 U/L (ref 10–44)
ANION GAP SERPL CALC-SCNC: 9 MMOL/L (ref 8–16)
AST SERPL-CCNC: 16 U/L (ref 10–40)
BASOPHILS # BLD AUTO: 0.03 K/UL (ref 0–0.2)
BASOPHILS NFR BLD: 0.5 % (ref 0–1.9)
BILIRUB SERPL-MCNC: 0.8 MG/DL (ref 0.1–1)
BUN SERPL-MCNC: 9 MG/DL (ref 6–20)
CALCIUM SERPL-MCNC: 9.5 MG/DL (ref 8.7–10.5)
CHLORIDE SERPL-SCNC: 105 MMOL/L (ref 95–110)
CHOLEST SERPL-MCNC: 196 MG/DL (ref 120–199)
CHOLEST/HDLC SERPL: 4 {RATIO} (ref 2–5)
CO2 SERPL-SCNC: 26 MMOL/L (ref 23–29)
CREAT SERPL-MCNC: 0.8 MG/DL (ref 0.5–1.4)
DIFFERENTIAL METHOD: NORMAL
EOSINOPHIL # BLD AUTO: 0 K/UL (ref 0–0.5)
EOSINOPHIL NFR BLD: 0.7 % (ref 0–8)
ERYTHROCYTE [DISTWIDTH] IN BLOOD BY AUTOMATED COUNT: 13.2 % (ref 11.5–14.5)
EST. GFR  (NO RACE VARIABLE): >60 ML/MIN/1.73 M^2
ESTIMATED AVG GLUCOSE: 105 MG/DL (ref 68–131)
GLUCOSE SERPL-MCNC: 88 MG/DL (ref 70–110)
HBA1C MFR BLD: 5.3 % (ref 4–5.6)
HCT VFR BLD AUTO: 39.8 % (ref 37–48.5)
HDLC SERPL-MCNC: 49 MG/DL (ref 40–75)
HDLC SERPL: 25 % (ref 20–50)
HGB BLD-MCNC: 13.3 G/DL (ref 12–16)
IMM GRANULOCYTES # BLD AUTO: 0.02 K/UL (ref 0–0.04)
IMM GRANULOCYTES NFR BLD AUTO: 0.3 % (ref 0–0.5)
LDLC SERPL CALC-MCNC: 131.4 MG/DL (ref 63–159)
LYMPHOCYTES # BLD AUTO: 2.5 K/UL (ref 1–4.8)
LYMPHOCYTES NFR BLD: 41.4 % (ref 18–48)
MCH RBC QN AUTO: 28.4 PG (ref 27–31)
MCHC RBC AUTO-ENTMCNC: 33.4 G/DL (ref 32–36)
MCV RBC AUTO: 85 FL (ref 82–98)
MONOCYTES # BLD AUTO: 0.3 K/UL (ref 0.3–1)
MONOCYTES NFR BLD: 5.2 % (ref 4–15)
NEUTROPHILS # BLD AUTO: 3.2 K/UL (ref 1.8–7.7)
NEUTROPHILS NFR BLD: 51.9 % (ref 38–73)
NONHDLC SERPL-MCNC: 147 MG/DL
NRBC BLD-RTO: 0 /100 WBC
PLATELET # BLD AUTO: 286 K/UL (ref 150–450)
PMV BLD AUTO: 11.1 FL (ref 9.2–12.9)
POTASSIUM SERPL-SCNC: 4 MMOL/L (ref 3.5–5.1)
PROT SERPL-MCNC: 7.9 G/DL (ref 6–8.4)
RBC # BLD AUTO: 4.68 M/UL (ref 4–5.4)
SODIUM SERPL-SCNC: 140 MMOL/L (ref 136–145)
TRIGL SERPL-MCNC: 78 MG/DL (ref 30–150)
WBC # BLD AUTO: 6.11 K/UL (ref 3.9–12.7)

## 2023-03-09 PROCEDURE — 36415 COLL VENOUS BLD VENIPUNCTURE: CPT | Performed by: INTERNAL MEDICINE

## 2023-03-09 PROCEDURE — 80061 LIPID PANEL: CPT | Performed by: INTERNAL MEDICINE

## 2023-03-09 PROCEDURE — 99999 PR PBB SHADOW E&M-EST. PATIENT-LVL V: CPT | Mod: PBBFAC,,, | Performed by: INTERNAL MEDICINE

## 2023-03-09 PROCEDURE — 1160F RVW MEDS BY RX/DR IN RCRD: CPT | Mod: CPTII,,, | Performed by: INTERNAL MEDICINE

## 2023-03-09 PROCEDURE — 1160F PR REVIEW ALL MEDS BY PRESCRIBER/CLIN PHARMACIST DOCUMENTED: ICD-10-PCS | Mod: CPTII,,, | Performed by: INTERNAL MEDICINE

## 2023-03-09 PROCEDURE — 99999 PR PBB SHADOW E&M-EST. PATIENT-LVL V: ICD-10-PCS | Mod: PBBFAC,,, | Performed by: INTERNAL MEDICINE

## 2023-03-09 PROCEDURE — 99214 PR OFFICE/OUTPT VISIT, EST, LEVL IV, 30-39 MIN: ICD-10-PCS | Mod: S$PBB,,, | Performed by: INTERNAL MEDICINE

## 2023-03-09 PROCEDURE — 3078F DIAST BP <80 MM HG: CPT | Mod: CPTII,,, | Performed by: INTERNAL MEDICINE

## 2023-03-09 PROCEDURE — 3074F PR MOST RECENT SYSTOLIC BLOOD PRESSURE < 130 MM HG: ICD-10-PCS | Mod: CPTII,,, | Performed by: INTERNAL MEDICINE

## 2023-03-09 PROCEDURE — 99214 OFFICE O/P EST MOD 30 MIN: CPT | Mod: S$PBB,,, | Performed by: INTERNAL MEDICINE

## 2023-03-09 PROCEDURE — 3008F PR BODY MASS INDEX (BMI) DOCUMENTED: ICD-10-PCS | Mod: CPTII,,, | Performed by: INTERNAL MEDICINE

## 2023-03-09 PROCEDURE — 83036 HEMOGLOBIN GLYCOSYLATED A1C: CPT | Performed by: INTERNAL MEDICINE

## 2023-03-09 PROCEDURE — 1159F PR MEDICATION LIST DOCUMENTED IN MEDICAL RECORD: ICD-10-PCS | Mod: CPTII,,, | Performed by: INTERNAL MEDICINE

## 2023-03-09 PROCEDURE — 99215 OFFICE O/P EST HI 40 MIN: CPT | Mod: PBBFAC | Performed by: INTERNAL MEDICINE

## 2023-03-09 PROCEDURE — 3074F SYST BP LT 130 MM HG: CPT | Mod: CPTII,,, | Performed by: INTERNAL MEDICINE

## 2023-03-09 PROCEDURE — 3008F BODY MASS INDEX DOCD: CPT | Mod: CPTII,,, | Performed by: INTERNAL MEDICINE

## 2023-03-09 PROCEDURE — 80053 COMPREHEN METABOLIC PANEL: CPT | Performed by: INTERNAL MEDICINE

## 2023-03-09 PROCEDURE — 85025 COMPLETE CBC W/AUTO DIFF WBC: CPT | Performed by: INTERNAL MEDICINE

## 2023-03-09 PROCEDURE — 3078F PR MOST RECENT DIASTOLIC BLOOD PRESSURE < 80 MM HG: ICD-10-PCS | Mod: CPTII,,, | Performed by: INTERNAL MEDICINE

## 2023-03-09 PROCEDURE — 1159F MED LIST DOCD IN RCRD: CPT | Mod: CPTII,,, | Performed by: INTERNAL MEDICINE

## 2023-03-09 RX ORDER — MELOXICAM 7.5 MG/1
7.5 TABLET ORAL DAILY PRN
Qty: 30 TABLET | Refills: 0 | Status: SHIPPED | OUTPATIENT
Start: 2023-03-09 | End: 2023-11-09 | Stop reason: SDUPTHER

## 2023-03-09 RX ORDER — ERGOCALCIFEROL 1.25 MG/1
50000 CAPSULE ORAL
Qty: 12 CAPSULE | Refills: 3 | Status: SHIPPED | OUTPATIENT
Start: 2023-03-09 | End: 2023-10-30 | Stop reason: ALTCHOICE

## 2023-03-09 RX ORDER — TRIAMCINOLONE ACETONIDE 1 MG/G
CREAM TOPICAL 2 TIMES DAILY
Qty: 45 G | Refills: 0 | Status: SHIPPED | OUTPATIENT
Start: 2023-03-09

## 2023-03-09 RX ORDER — METHOCARBAMOL 500 MG/1
500 TABLET, FILM COATED ORAL 3 TIMES DAILY PRN
Qty: 30 TABLET | Refills: 0 | Status: SHIPPED | OUTPATIENT
Start: 2023-03-09 | End: 2023-03-19

## 2023-03-09 RX ORDER — ACETAMINOPHEN 500 MG
2000 TABLET ORAL DAILY
Qty: 90 CAPSULE | Refills: 3 | Status: SHIPPED | OUTPATIENT
Start: 2023-03-09 | End: 2023-10-30 | Stop reason: ALTCHOICE

## 2023-03-09 NOTE — TELEPHONE ENCOUNTER
----- Message from Veda Hurley sent at 3/9/2023 11:09 AM CST -----  Regarding: Scheduling Follow-up  Please contact patient to schedule her 4 month follow-up ,, Unable to schedule Derm due to insurance, and podiatry is schedule 1st available in July at [facility].

## 2023-03-09 NOTE — PATIENT INSTRUCTIONS
Please call gyn at [facility] [place] [facility] to schedule since you are overdue for your pap.    Examples of healthy meals:    Spaghetti over zucchini noodles (Osborn's sauce is good), use ground turkey or extra lean ground beef.    Fish or shrimp tacos.  Use low carb tortillas (Furman).  No more than 2 per serving.  Top with salsa or chilo de booker.    Chili over cauliflower rice (lean beef or ground turkey).    Chicken tortilla soup.    Grilled chicken with spinach in  a low carb.    Goal weight is 250 lbs by July follow up.

## 2023-03-09 NOTE — PROGRESS NOTES
INTERNAL MEDICINE ESTABLISHED PATIENT VISIT NOTE    Subjective:     Chief Complaint: Follow-up  HTN, back pain, weight     Patient ID: Saskia Huang is a 42 y.o. female with HTN, chronic back pain, Vit D def, last seen by me in Oct, here today for 6 mo f/u.    At last visit was having issues c low back pain so was rx'ed Robaxin prn but states she did not take it.  Still c intermittent low back pain and intermittent neck pain.  Works from home and has not been as active and has been gaining weight.    Vit D also noted to be low at that time so was started on weekly Ergocalciferol.  States she does not think it was covered by insurance.    Still taking meds for HTN as rx'ed.    Past Medical History:  Past Medical History:   Diagnosis Date    Benign essential hypertension 3/15/2018    Hypertension        Home Medications:  Prior to Admission medications    Medication Sig Start Date End Date Taking? Authorizing Provider   amLODIPine (NORVASC) 5 MG tablet Take 1 tablet by mouth once daily 3/22/22   Pebbles Bernal MD   clotrimazole (LOTRIMIN) 1 % cream Apply topically 2 (two) times daily. 10/8/22   Pebbles Bernal MD   ergocalciferol (ERGOCALCIFEROL) 50,000 unit Cap Take 1 capsule (50,000 Units total) by mouth every 7 days. 10/9/22   Pebbles Bernal MD   hydroCHLOROthiazide (HYDRODIURIL) 25 MG tablet Take 1 tablet by mouth once daily 3/22/22   Pebbles Bernal MD   nystatin (MYCOSTATIN) powder Apply topically 2 (two) times daily. 10/8/22   Pebbles Bernal MD       Allergies:  Review of patient's allergies indicates:  No Known Allergies    Social History:  Social History     Tobacco Use    Smoking status: Never    Smokeless tobacco: Never   Substance Use Topics    Alcohol use: Yes     Comment: occasional, on holidays    Drug use: No        Review of Systems   Constitutional:  Negative for appetite change, chills, fatigue, fever and unexpected weight change.   HENT:  Negative for congestion, hearing loss and rhinorrhea.    Eyes:  Negative for  "pain and visual disturbance.   Respiratory:  Negative for cough, chest tightness, shortness of breath and wheezing.    Cardiovascular:  Negative for chest pain, palpitations and leg swelling.   Gastrointestinal:  Negative for abdominal distention and abdominal pain.   Endocrine: Negative for polydipsia and polyuria.   Genitourinary:  Negative for decreased urine volume, difficulty urinating, dysuria, hematuria and vaginal discharge.   Musculoskeletal:  Positive for back pain and neck pain.   Neurological:  Negative for weakness, numbness and headaches.   Psychiatric/Behavioral:  Negative for behavioral problems and confusion.        Health Maintenance:     Immunizations:   Influenza rec today, pt declined.  Risks and benefits were discussed with patient.    TDap 1/2020  COVID 8/2021, 9/2021, rec schedule Omicron booster.  Pt declined.     Cancer Screening:  [place]:  [place] [facility] [place] f/u for [employer]  Mammogram: 2/2023 benign  Colonoscopy: rec at 47    Objective:   /78 (BP Location: Left arm, Patient Position: Sitting, BP Method: Large (Manual))   Pulse 70   Ht 5' 7" (1.702 m)   Wt 116.8 kg (257 lb 8 oz)   LMP 03/07/2023   SpO2 99%   BMI 40.33 kg/m²        General: AAO x3, no apparent distress  HEENT: PERRL, OP clear  CV: RRR, no m/r/g  Pulm: Lungs CTAB, no crackles, no wheezes  Abd: s/NT/ND +BS  Extremities: no c/c/e    Labs:     Lab Results   Component Value Date    WBC 6.42 03/22/2022    HGB 12.7 03/22/2022    HCT 38.7 03/22/2022    MCV 85 03/22/2022     03/22/2022     Sodium   Date Value Ref Range Status   10/08/2022 139 136 - 145 mmol/L Final     Potassium   Date Value Ref Range Status   10/08/2022 3.6 3.5 - 5.1 mmol/L Final     Chloride   Date Value Ref Range Status   10/08/2022 107 95 - 110 mmol/L Final     CO2   Date Value Ref Range Status   10/08/2022 22 (L) 23 - 29 mmol/L Final     Glucose   Date Value Ref Range Status   10/08/2022 89 70 - 110 mg/dL Final     BUN   Date Value Ref " Range Status   10/08/2022 11 6 - 20 mg/dL Final     Creatinine   Date Value Ref Range Status   10/08/2022 0.8 0.5 - 1.4 mg/dL Final     Calcium   Date Value Ref Range Status   10/08/2022 8.7 8.7 - 10.5 mg/dL Final     Total Protein   Date Value Ref Range Status   10/08/2022 7.0 6.0 - 8.4 g/dL Final     Albumin   Date Value Ref Range Status   10/08/2022 3.3 (L) 3.5 - 5.2 g/dL Final     Total Bilirubin   Date Value Ref Range Status   10/08/2022 0.7 0.1 - 1.0 mg/dL Final     Comment:     For infants and newborns, interpretation of results should be based  on gestational age, weight and in agreement with clinical  observations.    Premature Infant recommended reference ranges:  Up to 24 hours.............<8.0 mg/dL  Up to 48 hours............<12.0 mg/dL  3-5 days..................<15.0 mg/dL  6-29 days.................<15.0 mg/dL       Alkaline Phosphatase   Date Value Ref Range Status   10/08/2022 86 55 - 135 U/L Final     AST   Date Value Ref Range Status   10/08/2022 18 10 - 40 U/L Final     ALT   Date Value Ref Range Status   10/08/2022 14 10 - 44 U/L Final     Anion Gap   Date Value Ref Range Status   10/08/2022 10 8 - 16 mmol/L Final     eGFR if    Date Value Ref Range Status   03/22/2022 >60.0 >60 mL/min/1.73 m^2 Final     eGFR if non    Date Value Ref Range Status   03/22/2022 >60.0 >60 mL/min/1.73 m^2 Final     Comment:     Calculation used to obtain the estimated glomerular filtration  rate (eGFR) is the CKD-EPI equation.        Lab Results   Component Value Date    HGBA1C 5.3 03/22/2022     Lab Results   Component Value Date    LDLCALC 122.4 03/22/2022     Lab Results   Component Value Date    TSH 1.325 01/20/2020         Assessment/Plan     Saskia was seen today for follow-up.    Diagnoses and all orders for this visit:    Benign essential hypertension  at goal.  Cont current medications.  -     Comprehensive Metabolic Panel; Future    Vitamin D deficiency  Will send both  otc dose vs weekly dose to see which one may be covered through her Medicaid.  If neither one covered through the pharmacy, will rec buying otc.  -     cholecalciferol, vitamin D3, (VITAMIN D3) 50 mcg (2,000 unit) Cap capsule; Take 1 capsule (2,000 Units total) by mouth once daily.  -     ergocalciferol (ERGOCALCIFEROL) 50,000 unit Cap; Take 1 capsule (50,000 Units total) by mouth every 7 days.    Chronic bilateral low back pain without sciatica  Discussed heating pad and stretching exercises.  Mobic prn okay  Robaxin as needed for mm tightness.  Will refer to healthy back if covered.  -     methocarbamoL (ROBAXIN) 500 MG Tab; Take 1 tablet (500 mg total) by mouth 3 (three) times daily as needed (muscle spasm).  -     meloxicam (MOBIC) 7.5 MG tablet; Take 1 tablet (7.5 mg total) by mouth daily as needed for Pain.  -     Ambulatory referral/consult to [facility]; Future    Class 3 severe obesity due to excess calories with serious comorbidity and body mass index (BMI) of 40.0 to 44.9 in adult  Counseled extensively on need for diet changes and daily exercise.  Discussed examples of healthy eating.  Recommend at least 30 minutes of exercise at least 5 days a week.      Screening, anemia, deficiency, iron  -     CBC Auto Differential; Future    Screening for diabetes mellitus  -     Hemoglobin A1C; Future    Lipid screening  -     Lipid Panel; Future    Eczema, unspecified type  -     Ambulatory referral/consult to Dermatology; Future  -     triamcinolone acetonide 0.1% (KENALOG) 0.1 % cream; Apply topically 2 (two) times daily.    Nail abnormalities  -     Ambulatory referral/consult to Podiatry; Future        HM as above  RTC in July, sooner if needed.  Labs today.    Pebbles Bernal MD  Department of Internal Medicine - [facility]  03/09/2023

## 2023-03-20 ENCOUNTER — TELEPHONE (OUTPATIENT)
Dept: INTERNAL MEDICINE | Facility: CLINIC | Age: 43
End: 2023-03-20
Payer: MEDICAID

## 2023-03-20 NOTE — TELEPHONE ENCOUNTER
----- Message from Pebbles Bernal MD sent at 3/18/2023  4:18 PM CDT -----  Recent labs all within normal range.    Nursing to notify.

## 2023-04-03 ENCOUNTER — PATIENT MESSAGE (OUTPATIENT)
Dept: ADMINISTRATIVE | Facility: HOSPITAL | Age: 43
End: 2023-04-03
Payer: MEDICAID

## 2023-10-30 ENCOUNTER — NURSE TRIAGE (OUTPATIENT)
Dept: ADMINISTRATIVE | Facility: CLINIC | Age: 43
End: 2023-10-30
Payer: COMMERCIAL

## 2023-10-30 ENCOUNTER — HOSPITAL ENCOUNTER (EMERGENCY)
Facility: HOSPITAL | Age: 43
Discharge: HOME | End: 2023-10-30
Attending: EMERGENCY MEDICINE
Payer: COMMERCIAL

## 2023-10-30 ENCOUNTER — TELEPHONE (OUTPATIENT)
Dept: INTERNAL MEDICINE | Facility: CLINIC | Age: 43
End: 2023-10-30
Payer: COMMERCIAL

## 2023-10-30 VITALS
SYSTOLIC BLOOD PRESSURE: 122 MMHG | WEIGHT: 257 LBS | RESPIRATION RATE: 15 BRPM | HEART RATE: 71 BPM | DIASTOLIC BLOOD PRESSURE: 77 MMHG | OXYGEN SATURATION: 97 % | TEMPERATURE: 98 F | BODY MASS INDEX: 40.25 KG/M2

## 2023-10-30 DIAGNOSIS — R51.9 ACUTE NONINTRACTABLE HEADACHE, UNSPECIFIED HEADACHE TYPE: Primary | ICD-10-CM

## 2023-10-30 PROCEDURE — 25000003 PHARM REV CODE 250: Performed by: EMERGENCY MEDICINE

## 2023-10-30 PROCEDURE — 99283 EMERGENCY DEPT VISIT LOW MDM: CPT

## 2023-10-30 RX ORDER — IBUPROFEN 600 MG/1
600 TABLET ORAL
Status: COMPLETED | OUTPATIENT
Start: 2023-10-30 | End: 2023-10-30

## 2023-10-30 RX ADMIN — IBUPROFEN 600 MG: 600 TABLET ORAL at 02:10

## 2023-10-30 NOTE — TELEPHONE ENCOUNTER
Pt states had headache, checked /92. Pt states took BP meds as prescribed, also took Tylenol at 1130pm. Pain 8/10, no improvement. Pt denies fever, vision problems, or weakness. Per protocol, see hcp with in 4 hours. Advised to have another adult drive. Discussed worsening symptoms for BP and advised to call back for any further questions or concerns.   Reason for Disposition   Systolic BP  >= 160 OR Diastolic >= 100   [1] SEVERE headache (e.g., excruciating) AND [2] not improved after 2 hours of pain medicine    Additional Information   Negative: Difficult to awaken or acting confused (e.g., disoriented, slurred speech)   Negative: SEVERE difficulty breathing (e.g., struggling for each breath, speaks in single words)   Negative: [1] Weakness of the face, arm or leg on one side of the body AND [2] new-onset   Negative: [1] Numbness (i.e., loss of sensation) of the face, arm or leg on one side of the body AND [2] new-onset   Negative: [1] Chest pain lasts > 5 minutes AND [2] history of heart disease (i.e., heart attack, bypass surgery, angina, angioplasty, CHF)   Negative: [1] Chest pain AND [2] took nitrogylcerin AND [3] pain was not relieved   Negative: Sounds like a life-threatening emergency to the triager   Negative: [1] Systolic BP  >= 160 OR Diastolic >= 100 AND [2] cardiac (e.g., breathing difficulty, chest pain) or neurologic symptoms (e.g., new-onset blurred or double vision, unsteady gait)   Negative: [1] Pregnant 20 or more weeks (or postpartum < 6 weeks) AND [2] new hand or face swelling   Negative: [1] Pregnant 20 or more weeks (or postpartum < 6 weeks) AND [2] Systolic BP >= 160 OR Diastolic >= 110   Negative: [1] Systolic BP  >= 200 OR Diastolic >= 120 AND [2] having NO cardiac or neurologic symptoms   Negative: [1] Pregnant 20 or more weeks (or postpartum < 6 weeks) AND [2] Systolic BP  >= 140 OR Diastolic >= 90   Negative: [1] Systolic BP  >= 180 OR Diastolic >= 110 AND [2] missed most recent  "dose of blood pressure medication   Negative: Systolic BP  >= 180 OR Diastolic >= 110   Negative: Ran out of BP medications   Negative: Difficult to awaken or acting confused (e.g., disoriented, slurred speech)   Negative: [1] Weakness of the face, arm or leg on one side of the body AND [2] new-onset   Negative: [1] Numbness of the face, arm or leg on one side of the body AND [2] new-onset   Negative: [1] Loss of speech or garbled speech AND [2] new-onset   Negative: Passed out (i.e., lost consciousness, collapsed and was not responding)   Negative: Sounds like a life-threatening emergency to the triager   Negative: Unable to walk, or can only walk with assistance (e.g., requires support)   Negative: Stiff neck (can't touch chin to chest)   Negative: Severe pain in one eye   Negative: [1] Other family members (or people in same household) with headaches AND [2] possibility of carbon monoxide exposure   Negative: [1] SEVERE headache (e.g., excruciating) AND [2] "worst headache" of life   Negative: [1] SEVERE headache AND [2] sudden-onset (i.e., reaching maximum intensity within seconds to 1 hour)   Negative: [1] SEVERE headache AND [2] fever   Negative: Loss of vision or double vision  (Exception: Same as prior migraines.)   Negative: [1] Fever > 100.0 F (37.8 C) AND [2] diabetes mellitus or weak immune system (e.g., HIV positive, cancer chemo, splenectomy, organ transplant, chronic steroids)   Negative: Patient sounds very sick or weak to the triager    Protocols used: Blood Pressure - High-A-AH, Headache-A-AH    "

## 2023-10-30 NOTE — Clinical Note
"Saskia "Bhanu Huang was seen and treated in our emergency department on 10/30/2023.  She may return to work on 11/01/2023.       If you have any questions or concerns, please don't hesitate to call.      Regina Lima MD"

## 2023-10-30 NOTE — ED TRIAGE NOTES
Saskia Huang, a 43 y.o. female presents to the ED w/ complaint of headache to right temple. States started earlier today so she took her bp meds. Headache did not go away so she took some tylenol. States when she checked her bp it was in 160s systolic and her headache was still present     Triage note:  Chief Complaint   Patient presents with    Hypertension     Highest reported /90s. Compliant with home medications. +headache     Review of patient's allergies indicates:  No Known Allergies  Past Medical History:   Diagnosis Date    Benign essential hypertension 3/15/2018    Hypertension       LOC: The patient is awake, alert, aware of environment with an appropriate affect. Oriented x4, speaking appropriately  APPEARANCE: Pt resting comfortably, in no acute distress, pt is clean and well groomed, clothing properly fastened  SKIN:The skin is warm and dry, color consistent with ethnicity, patient has normal skin turgor and moist mucus membranes, no bruising noted   RESPIRATORY:Airway is open and patent, respirations are spontaneous, patient has a normal effort and rate, no accessory muscle use noted.  CARDIAC: Normal rate and rhythm, no peripheral edema noted  ABDOMEN: Soft, non tender, non distended. obese  NEUROLOGIC: PERRLA, facial expression is symmetrical, patient moving all extremities spontaneously, normal sensation in all extremities when touched with a finger.  Follows all commands appropriately. Pt complaining of headache to right temple.  MUSCULOSKELETAL: Patient moving all extremities spontaneously, no obvious swelling or deformities noted.

## 2023-10-30 NOTE — ED PROVIDER NOTES
History:  Saskia Huang is a 43 y.o. female who presents to the ED with Hypertension (Highest reported /90s. Compliant with home medications. +headache)    Described as 43-year-old female with a history of hypertension presenting to the emergency department with concern for high blood pressure and headache.  She reports that she typically gets an intermittent headache which resolves with her blood pressure medications.  Tonight, after being out in about all day she realized she continued to have a typical headache, though now pounding sensation in her right temporal region.  She took her blood pressure medications but was concerned that her blood pressure was SBP is 160s.  She called the nursing hotline, who told her to go to the emergency department.  She reports her headache is currently a 6/10.  She did try Tylenol at home, which did not help.  She denies any nausea or vomiting, photophobia, fevers, chills, head trauma, vision changes.    Review of Systems: All systems reviewed and are negative except as per history of present illness.    Medications:   Discharge Medication List as of 10/30/2023  3:10 AM        CONTINUE these medications which have NOT CHANGED    Details   amLODIPine (NORVASC) 5 MG tablet Take 1 tablet by mouth once daily, Normal      hydroCHLOROthiazide (HYDRODIURIL) 25 MG tablet Take 1 tablet by mouth once daily, Normal      meloxicam (MOBIC) 7.5 MG tablet Take 1 tablet (7.5 mg total) by mouth daily as needed for Pain., Starting Thu 3/9/2023, Normal      triamcinolone acetonide 0.1% (KENALOG) 0.1 % cream Apply topically 2 (two) times daily., Starting Thu 3/9/2023, Normal             PMH:   Past Medical History:   Diagnosis Date    Benign essential hypertension 3/15/2018    Hypertension      PSH: No past surgical history on file.  Allergies: She has No Known Allergies.  Social History: Marital Status: single. She  reports that she has never smoked. She has never used smokeless  tobacco.. She  reports current alcohol use..       Exam:  VITAL SIGNS:   Vitals:    10/30/23 0203   BP: 122/77   BP Location: Right arm   Patient Position: Lying   Pulse: 71   Resp: 15   Temp: 97.8 °F (36.6 °C)   TempSrc: Oral   SpO2: 97%   Weight: 116.6 kg (257 lb)     Const: Awake and alert, NAD   Head: Atraumatic  Eyes: Normal Conjunctiva  ENT: Normal External Ears, Nose and Mouth.  Neck: Full range of motion. No meningismus.  Resp: Normal respiratory effort, No distress  Cardio: Equal and intact distal pulses  Abd: Soft, non tender, non distended.   Skin: No petechiae or rashes  Ext: No cyanosis, or edema  Neur: Awake and alert, GCS 15, moves all extremities equally, CN intact.   Psych: Normal Mood and Affect    Medical Decision Makin-year-old female presenting with a headache and concern for high blood pressure.  Blood pressure was normal.  She was given ibuprofen.  On reassessment, headache has significantly improved, patient requesting discharge home.  Her pain was more temporal in nature, though no other symptoms, low suspicion for temporal arteritis.  Doubt ICH, CVA/TIA, venous sinus thrombosis, meningitis.  Suspect persistent headache is likely secondary to poor sleep patterns.  Patient is stable for discharge home with outpatient follow up.    Clinical Impression:  1. Acute nonintractable headache, unspecified headache type             Medication List        ASK your doctor about these medications      amLODIPine 5 MG tablet  Commonly known as: NORVASC  Take 1 tablet by mouth once daily     hydroCHLOROthiazide 25 MG tablet  Commonly known as: HYDRODIURIL  Take 1 tablet by mouth once daily     meloxicam 7.5 MG tablet  Commonly known as: MOBIC  Take 1 tablet (7.5 mg total) by mouth daily as needed for Pain.     triamcinolone acetonide 0.1% 0.1 % cream  Commonly known as: KENALOG  Apply topically 2 (two) times daily.              Follow-up Information       Pebbles Bernal MD.    Specialty: Internal  Medicine  Contact information:  [address]  [facility] [address]  [phone]                               Regina Lima MD  10/30/23 3253

## 2023-11-09 ENCOUNTER — OFFICE VISIT (OUTPATIENT)
Dept: INTERNAL MEDICINE | Facility: CLINIC | Age: 43
End: 2023-11-09
Payer: COMMERCIAL

## 2023-11-09 ENCOUNTER — LAB VISIT (OUTPATIENT)
Dept: LAB | Facility: HOSPITAL | Age: 43
End: 2023-11-09
Attending: INTERNAL MEDICINE
Payer: COMMERCIAL

## 2023-11-09 VITALS
HEIGHT: 67 IN | DIASTOLIC BLOOD PRESSURE: 70 MMHG | OXYGEN SATURATION: 95 % | WEIGHT: 259.69 LBS | SYSTOLIC BLOOD PRESSURE: 116 MMHG | HEART RATE: 83 BPM | BODY MASS INDEX: 40.76 KG/M2

## 2023-11-09 DIAGNOSIS — M54.50 CHRONIC BILATERAL LOW BACK PAIN WITHOUT SCIATICA: ICD-10-CM

## 2023-11-09 DIAGNOSIS — I10 BENIGN ESSENTIAL HYPERTENSION: Primary | ICD-10-CM

## 2023-11-09 DIAGNOSIS — R09.81 SINUS CONGESTION: ICD-10-CM

## 2023-11-09 DIAGNOSIS — E66.01 CLASS 3 SEVERE OBESITY DUE TO EXCESS CALORIES WITH SERIOUS COMORBIDITY AND BODY MASS INDEX (BMI) OF 40.0 TO 44.9 IN ADULT: ICD-10-CM

## 2023-11-09 DIAGNOSIS — E55.9 VITAMIN D DEFICIENCY: ICD-10-CM

## 2023-11-09 DIAGNOSIS — Z13.29 SCREENING FOR THYROID DISORDER: ICD-10-CM

## 2023-11-09 DIAGNOSIS — L30.9 ECZEMA, UNSPECIFIED TYPE: ICD-10-CM

## 2023-11-09 DIAGNOSIS — G89.29 CHRONIC BILATERAL LOW BACK PAIN WITHOUT SCIATICA: ICD-10-CM

## 2023-11-09 DIAGNOSIS — I10 BENIGN ESSENTIAL HYPERTENSION: ICD-10-CM

## 2023-11-09 DIAGNOSIS — Z12.31 SCREENING MAMMOGRAM, ENCOUNTER FOR: ICD-10-CM

## 2023-11-09 LAB
25(OH)D3+25(OH)D2 SERPL-MCNC: 8 NG/ML (ref 30–96)
ALBUMIN SERPL BCP-MCNC: 3.4 G/DL (ref 3.5–5.2)
ALP SERPL-CCNC: 78 U/L (ref 55–135)
ALT SERPL W/O P-5'-P-CCNC: 12 U/L (ref 10–44)
ANION GAP SERPL CALC-SCNC: 10 MMOL/L (ref 8–16)
AST SERPL-CCNC: 15 U/L (ref 10–40)
BILIRUB SERPL-MCNC: 0.9 MG/DL (ref 0.1–1)
BUN SERPL-MCNC: 11 MG/DL (ref 6–20)
CALCIUM SERPL-MCNC: 9.1 MG/DL (ref 8.7–10.5)
CHLORIDE SERPL-SCNC: 104 MMOL/L (ref 95–110)
CO2 SERPL-SCNC: 27 MMOL/L (ref 23–29)
CREAT SERPL-MCNC: 0.8 MG/DL (ref 0.5–1.4)
EST. GFR  (NO RACE VARIABLE): >60 ML/MIN/1.73 M^2
GLUCOSE SERPL-MCNC: 88 MG/DL (ref 70–110)
POTASSIUM SERPL-SCNC: 3.5 MMOL/L (ref 3.5–5.1)
PROT SERPL-MCNC: 7.4 G/DL (ref 6–8.4)
SODIUM SERPL-SCNC: 141 MMOL/L (ref 136–145)
TSH SERPL DL<=0.005 MIU/L-ACNC: 1.69 UIU/ML (ref 0.4–4)

## 2023-11-09 PROCEDURE — 3074F SYST BP LT 130 MM HG: CPT | Mod: CPTII,S$GLB,, | Performed by: INTERNAL MEDICINE

## 2023-11-09 PROCEDURE — 84443 ASSAY THYROID STIM HORMONE: CPT | Performed by: INTERNAL MEDICINE

## 2023-11-09 PROCEDURE — 1160F PR REVIEW ALL MEDS BY PRESCRIBER/CLIN PHARMACIST DOCUMENTED: ICD-10-PCS | Mod: CPTII,S$GLB,, | Performed by: INTERNAL MEDICINE

## 2023-11-09 PROCEDURE — 1160F RVW MEDS BY RX/DR IN RCRD: CPT | Mod: CPTII,S$GLB,, | Performed by: INTERNAL MEDICINE

## 2023-11-09 PROCEDURE — 3008F PR BODY MASS INDEX (BMI) DOCUMENTED: ICD-10-PCS | Mod: CPTII,S$GLB,, | Performed by: INTERNAL MEDICINE

## 2023-11-09 PROCEDURE — 3008F BODY MASS INDEX DOCD: CPT | Mod: CPTII,S$GLB,, | Performed by: INTERNAL MEDICINE

## 2023-11-09 PROCEDURE — 99999 PR PBB SHADOW E&M-EST. PATIENT-LVL IV: CPT | Mod: PBBFAC,,, | Performed by: INTERNAL MEDICINE

## 2023-11-09 PROCEDURE — 3074F PR MOST RECENT SYSTOLIC BLOOD PRESSURE < 130 MM HG: ICD-10-PCS | Mod: CPTII,S$GLB,, | Performed by: INTERNAL MEDICINE

## 2023-11-09 PROCEDURE — 1159F MED LIST DOCD IN RCRD: CPT | Mod: CPTII,S$GLB,, | Performed by: INTERNAL MEDICINE

## 2023-11-09 PROCEDURE — 36415 COLL VENOUS BLD VENIPUNCTURE: CPT | Performed by: INTERNAL MEDICINE

## 2023-11-09 PROCEDURE — 99999 PR PBB SHADOW E&M-EST. PATIENT-LVL IV: ICD-10-PCS | Mod: PBBFAC,,, | Performed by: INTERNAL MEDICINE

## 2023-11-09 PROCEDURE — 3078F DIAST BP <80 MM HG: CPT | Mod: CPTII,S$GLB,, | Performed by: INTERNAL MEDICINE

## 2023-11-09 PROCEDURE — 3044F HG A1C LEVEL LT 7.0%: CPT | Mod: CPTII,S$GLB,, | Performed by: INTERNAL MEDICINE

## 2023-11-09 PROCEDURE — 80053 COMPREHEN METABOLIC PANEL: CPT | Performed by: INTERNAL MEDICINE

## 2023-11-09 PROCEDURE — 3078F PR MOST RECENT DIASTOLIC BLOOD PRESSURE < 80 MM HG: ICD-10-PCS | Mod: CPTII,S$GLB,, | Performed by: INTERNAL MEDICINE

## 2023-11-09 PROCEDURE — 1159F PR MEDICATION LIST DOCUMENTED IN MEDICAL RECORD: ICD-10-PCS | Mod: CPTII,S$GLB,, | Performed by: INTERNAL MEDICINE

## 2023-11-09 PROCEDURE — 82306 VITAMIN D 25 HYDROXY: CPT | Performed by: INTERNAL MEDICINE

## 2023-11-09 PROCEDURE — 99214 OFFICE O/P EST MOD 30 MIN: CPT | Mod: S$GLB,,, | Performed by: INTERNAL MEDICINE

## 2023-11-09 PROCEDURE — 99214 PR OFFICE/OUTPT VISIT, EST, LEVL IV, 30-39 MIN: ICD-10-PCS | Mod: S$GLB,,, | Performed by: INTERNAL MEDICINE

## 2023-11-09 PROCEDURE — 3044F PR MOST RECENT HEMOGLOBIN A1C LEVEL <7.0%: ICD-10-PCS | Mod: CPTII,S$GLB,, | Performed by: INTERNAL MEDICINE

## 2023-11-09 RX ORDER — MELOXICAM 7.5 MG/1
7.5 TABLET ORAL DAILY PRN
Qty: 30 TABLET | Refills: 0 | Status: SHIPPED | OUTPATIENT
Start: 2023-11-09

## 2023-11-09 RX ORDER — TRIAMCINOLONE ACETONIDE 5 MG/G
OINTMENT TOPICAL 2 TIMES DAILY
Qty: 15 G | Refills: 0 | Status: SHIPPED | OUTPATIENT
Start: 2023-11-09

## 2023-11-09 RX ORDER — FLUTICASONE PROPIONATE 50 MCG
1 SPRAY, SUSPENSION (ML) NASAL DAILY
Qty: 16 G | Refills: 2 | Status: SHIPPED | OUTPATIENT
Start: 2023-11-09

## 2023-11-09 NOTE — PROGRESS NOTES
INTERNAL MEDICINE ESTABLISHED PATIENT VISIT NOTE    Subjective:     Chief Complaint: Hospital Follow Up       Patient ID: Saskia Huang is a 43 y.o. female with HTN, chronic back pain, Vit D def, , last seen by me in March, here today for ER f/u.    Had ED visit on 10/30 for elevated /90s and headache.  Has been on Amlodipine 5 mg daily and HCTZ 25 mg daily for HTN.    States h/a was unilateral and throbbing but has since resolved.  Denies cp or sob.    Past Medical History:  Past Medical History:   Diagnosis Date    Benign essential hypertension 3/15/2018    Hypertension        Home Medications:  Prior to Admission medications    Medication Sig Start Date End Date Taking? Authorizing Provider   amLODIPine (NORVASC) 5 MG tablet Take 1 tablet by mouth once daily 6/19/23  Yes Pebbles Bernal MD   hydroCHLOROthiazide (HYDRODIURIL) 25 MG tablet Take 1 tablet by mouth once daily 6/19/23  Yes Pebbles Bernal MD   meloxicam (MOBIC) 7.5 MG tablet Take 1 tablet (7.5 mg total) by mouth daily as needed for Pain. 3/9/23  Yes Pebbles Bernal MD   triamcinolone acetonide 0.1% (KENALOG) 0.1 % cream Apply topically 2 (two) times daily. 3/9/23  Yes Pebbles Bernal MD       Allergies:  Review of patient's allergies indicates:  No Known Allergies    Social History:  Social History     Tobacco Use    Smoking status: Never    Smokeless tobacco: Never   Substance Use Topics    Alcohol use: Yes     Comment: occasional, on holidays    Drug use: No        Review of Systems   Constitutional:  Negative for chills, fatigue and fever.   Respiratory:  Negative for cough, chest tightness and shortness of breath.    Cardiovascular:  Negative for chest pain.   Gastrointestinal:  Negative for abdominal pain and blood in stool.   Genitourinary:  Negative for dysuria and frequency.         Health Maintenance:     Immunizations:   Influenza rec today, pt declined.  Risks and benefits were discussed with patient.   TDap 1/2020  COVID 8/2021, 9/2021, rec  "schedule Omicron booster.  Pt declined.     Cancer Screening:  PAP:  sees gyn at [facility], has appt [facility] in Feb  Mammogram: 2/2023 benign, will schedule repeat for Feb.  Colonoscopy: rec at 47    Objective:   /70 (BP Location: Right arm, Patient Position: Sitting, BP Method: Large (Manual))   Pulse 83   Ht 5' 7" (1.702 m)   Wt 117.8 kg (259 lb 11.2 oz)   LMP 10/18/2023   SpO2 95%   BMI 40.68 kg/m²        General: AAO x3, no apparent distress  HEENT: no cervical LAD, no thyroid masses appreciated  CV: RRR, no m/r/g  Pulm: Lungs CTAB, no crackles, no wheezes  Abd: s/NT/ND +BS  Extremities: no c/c/e    Labs:     Lab Results   Component Value Date    WBC 6.11 03/09/2023    HGB 13.3 03/09/2023    HCT 39.8 03/09/2023    MCV 85 03/09/2023     03/09/2023     Sodium   Date Value Ref Range Status   11/09/2023 141 136 - 145 mmol/L Final     Potassium   Date Value Ref Range Status   11/09/2023 3.5 3.5 - 5.1 mmol/L Final     Chloride   Date Value Ref Range Status   11/09/2023 104 95 - 110 mmol/L Final     CO2   Date Value Ref Range Status   11/09/2023 27 23 - 29 mmol/L Final     Glucose   Date Value Ref Range Status   11/09/2023 88 70 - 110 mg/dL Final     BUN   Date Value Ref Range Status   11/09/2023 11 6 - 20 mg/dL Final     Creatinine   Date Value Ref Range Status   11/09/2023 0.8 0.5 - 1.4 mg/dL Final     Calcium   Date Value Ref Range Status   11/09/2023 9.1 8.7 - 10.5 mg/dL Final     Total Protein   Date Value Ref Range Status   11/09/2023 7.4 6.0 - 8.4 g/dL Final     Albumin   Date Value Ref Range Status   11/09/2023 3.4 (L) 3.5 - 5.2 g/dL Final     Total Bilirubin   Date Value Ref Range Status   11/09/2023 0.9 0.1 - 1.0 mg/dL Final     Comment:     For infants and newborns, interpretation of results should be based  on gestational age, weight and in agreement with clinical  observations.    Premature Infant recommended reference ranges:  Up to 24 hours.............<8.0 mg/dL  Up to 48 " hours............<12.0 mg/dL  3-5 days..................<15.0 mg/dL  6-29 days.................<15.0 mg/dL       Alkaline Phosphatase   Date Value Ref Range Status   11/09/2023 78 55 - 135 U/L Final     AST   Date Value Ref Range Status   11/09/2023 15 10 - 40 U/L Final     ALT   Date Value Ref Range Status   11/09/2023 12 10 - 44 U/L Final     Anion Gap   Date Value Ref Range Status   11/09/2023 10 8 - 16 mmol/L Final     eGFR if    Date Value Ref Range Status   03/22/2022 >60.0 >60 mL/min/1.73 m^2 Final     eGFR if non    Date Value Ref Range Status   03/22/2022 >60.0 >60 mL/min/1.73 m^2 Final     Comment:     Calculation used to obtain the estimated glomerular filtration  rate (eGFR) is the CKD-EPI equation.        Lab Results   Component Value Date    HGBA1C 5.3 03/09/2023     Lab Results   Component Value Date    LDLCALC 131.4 03/09/2023     Lab Results   Component Value Date    TSH 1.690 11/09/2023         Assessment/Plan     Saskia was seen today for hospital follow up.    Diagnoses and all orders for this visit:    Benign essential hypertension  at goal.  Cont current medications.  Suspect previous h/a 2/2 migraine  Discussed prevention  Rec otc tylenol or motrin prn, consider Imitrex if recurrent  -     Comprehensive Metabolic Panel; Future    Vitamin D deficiency  Not on supplement, having sx of fatigue  Will check labs  -     Vitamin D; Future    Class 3 severe obesity due to excess calories with serious comorbidity and body mass index (BMI) of 40.0 to 44.9 in adult  Counseled extensively on need for diet changes and daily exercise.  Discussed examples of healthy eating.  Recommend at least 30 minutes of exercise at least 5 days a week.    -     TSH; Future    Chronic bilateral low back pain without sciatica  -     meloxicam (MOBIC) 7.5 MG tablet; Take 1 tablet (7.5 mg total) by mouth daily as needed for Pain.    Sinus congestion  -     fluticasone propionate (FLONASE)  50 mcg/actuation nasal spray; 1 spray (50 mcg total) by Each Nostril route once daily.    Eczema, unspecified type  Noted around ears  Discussed avoidance of ear buds containing silicone as this can sometimes worsen sx.  -     triamcinolone (KENALOG) 0.5 % ointment; Apply topically 2 (two) times daily. Use on affected dry skin around your ears    Screening mammogram, encounter for  -     Mammo Digital Screening Bilat w/ Adam; Future    Screening for thyroid disorder  -     TSH; Future        HM as above  RTC in 6 mos, sooner if needed.  Labs today, declined vaccinations.    Pebbles Bernal MD  Department of Internal Medicine - [facility]  11/12/2023

## 2023-11-12 DIAGNOSIS — E55.9 VITAMIN D DEFICIENCY: Primary | ICD-10-CM

## 2023-11-12 RX ORDER — ERGOCALCIFEROL 1.25 MG/1
50000 CAPSULE ORAL
Qty: 12 CAPSULE | Refills: 3 | Status: SHIPPED | OUTPATIENT
Start: 2023-11-12

## 2023-11-13 ENCOUNTER — TELEPHONE (OUTPATIENT)
Dept: INTERNAL MEDICINE | Facility: CLINIC | Age: 43
End: 2023-11-13
Payer: COMMERCIAL

## 2023-11-13 NOTE — TELEPHONE ENCOUNTER
Called and spoke with pt, informed her of recent blood work. Pt expressed understanding, with no further questions.

## 2023-11-13 NOTE — TELEPHONE ENCOUNTER
----- Message from Pebbles Bernal MD sent at 11/12/2023  5:58 PM CST -----  Vit D very low.  Nursing to notify.  All other labs, including thyroid, kidney, and liver function as well as sugars, are all normal.  I recommend she take a weekly Vitamin D supplement, which has been sent to her local pharmacy.  She should stay on this indefinitely unless I tell her otherwise.

## 2023-11-26 ENCOUNTER — HOSPITAL ENCOUNTER (EMERGENCY)
Facility: HOSPITAL | Age: 43
Discharge: HOME | End: 2023-11-26
Attending: STUDENT IN AN ORGANIZED HEALTH CARE EDUCATION/TRAINING PROGRAM
Payer: COMMERCIAL

## 2023-11-26 VITALS
WEIGHT: 253 LBS | HEART RATE: 70 BPM | BODY MASS INDEX: 39.63 KG/M2 | RESPIRATION RATE: 16 BRPM | TEMPERATURE: 98 F | OXYGEN SATURATION: 98 % | DIASTOLIC BLOOD PRESSURE: 68 MMHG | SYSTOLIC BLOOD PRESSURE: 122 MMHG

## 2023-11-26 DIAGNOSIS — M76.892 HIP FLEXOR TENDONITIS, LEFT: ICD-10-CM

## 2023-11-26 DIAGNOSIS — M25.552 HIP PAIN, ACUTE, LEFT: Primary | ICD-10-CM

## 2023-11-26 LAB
B-HCG UR QL: NEGATIVE
CTP QC/QA: YES

## 2023-11-26 PROCEDURE — 81025 URINE PREGNANCY TEST: CPT | Performed by: PHYSICIAN ASSISTANT

## 2023-11-26 PROCEDURE — 96372 THER/PROPH/DIAG INJ SC/IM: CPT | Performed by: PHYSICIAN ASSISTANT

## 2023-11-26 PROCEDURE — 63600175 PHARM REV CODE 636 W HCPCS: Performed by: PHYSICIAN ASSISTANT

## 2023-11-26 PROCEDURE — 25000003 PHARM REV CODE 250: Performed by: PHYSICIAN ASSISTANT

## 2023-11-26 PROCEDURE — 99284 EMERGENCY DEPT VISIT MOD MDM: CPT

## 2023-11-26 RX ORDER — KETOROLAC TROMETHAMINE 30 MG/ML
15 INJECTION, SOLUTION INTRAMUSCULAR; INTRAVENOUS
Status: DISCONTINUED | OUTPATIENT
Start: 2023-11-26 | End: 2023-11-26

## 2023-11-26 RX ORDER — NAPROXEN 500 MG/1
500 TABLET ORAL 2 TIMES DAILY PRN
Qty: 20 TABLET | Refills: 0 | Status: SHIPPED | OUTPATIENT
Start: 2023-11-26

## 2023-11-26 RX ORDER — KETOROLAC TROMETHAMINE 30 MG/ML
15 INJECTION, SOLUTION INTRAMUSCULAR; INTRAVENOUS
Status: COMPLETED | OUTPATIENT
Start: 2023-11-26 | End: 2023-11-26

## 2023-11-26 RX ORDER — LIDOCAINE 50 MG/G
1 PATCH TOPICAL
Status: DISCONTINUED | OUTPATIENT
Start: 2023-11-26 | End: 2023-11-26 | Stop reason: HOSPADM

## 2023-11-26 RX ORDER — LIDOCAINE 50 MG/G
1 PATCH TOPICAL DAILY
Qty: 7 PATCH | Refills: 0 | Status: SHIPPED | OUTPATIENT
Start: 2023-11-26

## 2023-11-26 RX ORDER — NAPROXEN 500 MG/1
500 TABLET ORAL
Status: DISCONTINUED | OUTPATIENT
Start: 2023-11-26 | End: 2023-11-26

## 2023-11-26 RX ADMIN — KETOROLAC TROMETHAMINE 15 MG: 30 INJECTION, SOLUTION INTRAMUSCULAR; INTRAVENOUS at 02:11

## 2023-11-26 RX ADMIN — LIDOCAINE 1 PATCH: 50 PATCH TOPICAL at 04:11

## 2023-11-26 NOTE — ED NOTES
Patient identifiers verified and correct for Bryantreneyessi Sal   LOC: The patient is awake, alert and aware of environment with an appropriate affect, the patient is oriented x 3 and speaking appropriately.   APPEARANCE: Patient appears comfortable and in no acute distress  SKIN: The skin is warm and dry, color consistent with ethnicity, patient has normal skin turgor and moist mucus membranes, skin intact, no breakdown or bruising noted.   MUSCULOSKELETAL: Patient moving all extremities spontaneously, no swelling noted. Pt c/o left leg pain   RESPIRATORY: Airway is open and patent, respirations are spontaneous, patient has a normal effort and rate, no accessory muscle use noted, O2 sats noted at 100% on room air.  CARDIAC:  Patient has a normal rate, no edema noted, capillary refill < 3 seconds.   GASTRO: Soft and non tender to palpation, no distention noted, normoactive bowel sounds present in all four quadrants. Pt states bowel movements have been regular.  : Pt denies any pain or frequency with urination.  NEURO: Pt opens eyes spontaneously, behavior appropriate to situation, follows commands, facial expression symmetrical, bilateral hand grasp equal and even, purposeful motor response noted, normal sensation in all extremities when touched with a finger.

## 2023-11-26 NOTE — ED PROVIDER NOTES
Department of Anesthesiology  Postprocedure Note    Patient: Valentina Panda  MRN: [id number]  YOB: 1941  Date of evaluation: 10/9/2023      Procedure Summary     Date: 10/09/23 Room / Location: [facility] ENDO 02 / [facility] ENDOSCOPY    Anesthesia Start: 2933 Anesthesia Stop: 3278    Procedure: COLONOSCOPY with Polypectomies (Abdomen) Diagnosis:       Personal history of colonic polyps      Change in bowel habits      Gastroesophageal reflux disease, unspecified whether esophagitis present      Obesity (BMI 30-39.9)      (Personal history of colonic polyps [Z86.010])      (Change in bowel habits [R19.4])      (Gastroesophageal reflux disease, unspecified whether esophagitis present [K21.9])      (Obesity (BMI 30-39. 9) [E66.9])    Surgeons: Debo Donis MD Responsible Provider: Janina Bellamy MD    Anesthesia Type: MAC ASA Status: 3          Anesthesia Type: MAC    Shayan Phase I: Shayan Score: 10    Shayan Phase II: Shayan Score: 10      Anesthesia Post Evaluation    Patient location during evaluation: bedside  Patient participation: complete - patient participated  Airway patency: patent  Complications: no  Cardiovascular status: hemodynamically stable  Respiratory status: acceptable  Hydration status: stable Encounter Date: 11/26/2023       History     Chief Complaint   Patient presents with    Leg Pain     Pt reports left sided leg pain beginning Friday that has progressively gotten worse; pt describes it as being 'stiff' and painful 9/10 concentrated just below left hip that travels down leg      43-year-old female presents to the ED with hip pain.  Patient reports pain to her left mid upper thigh for the past couple of days.  She can not recall a specific injury.  She has pain with flexing the hip as well as bearing weight.  She took Flexeril last night which did not provide any significant relief.       Review of patient's allergies indicates:  No Known Allergies  Past Medical History:   Diagnosis Date    Benign essential hypertension 3/15/2018    Hypertension      History reviewed. No pertinent surgical history.  Family History   Problem Relation Age of Onset    Hypertension Mother     No Known Problems Father     Diabetes Paternal Grandmother     Depression Neg Hx     Hearing loss Neg Hx     Melanoma Neg Hx      Social History     Tobacco Use    Smoking status: Never    Smokeless tobacco: Never   Substance Use Topics    Alcohol use: Yes     Comment: occasional, on holidays    Drug use: No     Review of Systems    Physical Exam     Initial Vitals [11/26/23 1312]   BP Pulse Resp Temp SpO2   (!) 137/93 75 18 98.9 °F (37.2 °C) 100 %      MAP       --         Physical Exam    Nursing note and vitals reviewed.  Constitutional: She appears well-developed and well-nourished. She is not diaphoretic.  Non-toxic appearance. She does not appear ill. No distress.   HENT:   Head: Normocephalic and atraumatic.   Neck: Neck supple.   Cardiovascular:  Normal rate and regular rhythm.     Exam reveals no gallop and no friction rub.       No murmur heard.  Pulmonary/Chest: Effort normal and breath sounds normal. No accessory muscle usage. No tachypnea. No respiratory distress. She has no decreased breath sounds. She has no wheezes.  She has no rhonchi. She has no rales.   Abdominal: She exhibits no distension.   Musculoskeletal:      Cervical back: Neck supple.        Legs:       Comments: Patient has marked tenderness to the L mid proximal thigh adjacent to the inguinal area.  No masses. No erythema, induration, wounds. Pain with passive flexion and abduction of the left hip. Distal pulse and sensation intact.      Neurological: She is alert.   Skin: No rash noted.   Psychiatric: She has a normal mood and affect. Her behavior is normal.         ED Course   Procedures  Labs Reviewed   POCT URINE PREGNANCY          Imaging Results              X-Ray Hip 2 or 3 views Left (with Pelvis when performed) (Final result)  Result time 11/26/23 15:11:00      Final result by Mark Salinas MD (11/26/23 15:11:00)                   Impression:      1. No acute displaced fracture or dislocation of the left hip.      Electronically signed by: Mark Salinas MD  Date:    11/26/2023  Time:    15:11               Narrative:    EXAMINATION:  XR HIP WITH PELVIS WHEN PERFORMED, 2 OR 3 VIEWS LEFT    CLINICAL HISTORY:  Pain in left hip    TECHNIQUE:  AP view of the pelvis and frog leg lateral view of the left hip were performed.    COMPARISON:  None    FINDINGS:  Three views left hip.    The bilateral sacroiliac joints are intact.  The pubic symphysis is intact.  The bilateral femoroacetabular joints are intact.  No radiopaque foreign body.                                       Medications   LIDOcaine 5 % patch 1 patch (1 patch Transdermal Patch Applied 11/26/23 1600)   ketorolac injection 15 mg (15 mg Intramuscular Given 11/26/23 1416)     Medical Decision Making  43-year-old female presents to the ED with the pain for the past couple of days without clear injury.  She is neurovascularly intact.  Vitals within normal limits.  She has focal tenderness to the proximal midline thigh.  Hip x-ray did not show any evidence of fracture or significant arthritis.   Based on patient's exam, feel that her symptoms are likely secondary to tendinitis.  She received Toradol injection in the ED and did report some improvement but not complete resolution of her pain.  I feel that she can be discharged in stable condition.  Will prescribe NSAIDs.  Patient was also provided with a cane for assistance in ambulation due to her level of pain with weight-bearing and ambulation.  Will refer to sports Medicine for follow-up if symptoms are not improving.  ED return precautions given.  Patient voiced understanding. I have reviewed the patient's records and discussed this case with my supervising physician.         Amount and/or Complexity of Data Reviewed  Labs: ordered.  Radiology: ordered.    Risk  Prescription drug management.                                   Clinical Impression:  Final diagnoses:  [M25.552] Hip pain, acute, left (Primary)  [M76.892] Hip flexor tendonitis, left          ED Disposition Condition    Discharge Stable          ED Prescriptions       Medication Sig Dispense Start Date End Date Auth. Provider    naproxen (NAPROSYN) 500 MG tablet Take 1 tablet (500 mg total) by mouth 2 (two) times daily as needed. Take with food 20 tablet 11/26/2023 -- Ame Padilla PA-C    LIDOcaine (LIDODERM) 5 % Place 1 patch onto the skin once daily. Remove & Discard patch within 12 hours or as directed by MD 7 patch 11/26/2023 -- Ame Padilla PA-C          Follow-up Information       Follow up With Specialties Details Why Contact Info Additional Information    Laurie RAJAN - Sports Med [facility] Sports Medicine Schedule an appointment as soon as possible for a visit   [address]  [facility] [address] [phone] Please park in surface lot or garage and check in on the first floor, [address]             Ame Padilla PA-C  11/26/23 8026

## 2023-11-26 NOTE — ED TRIAGE NOTES
Pt reports left sided leg pain beginning Friday that has progressively gotten worse; pt describes it as being 'stiff' and painful 9/10 concentrated just below left hip that travels down leg. Denies trauma or falls

## 2023-11-26 NOTE — DISCHARGE INSTRUCTIONS
Your hip x-ray did not show any broken bones.  You likely have inflammation in one of the tendons near your hip.   Take the naproxen 500 mg every 12 hours with food as needed for pain.  DO NOT take any additional Aleve, naproxen, ibuprofen (Advil, Motrin) while taking this medication.  You can also take acetaminophen/tylenol 650 mg every 6 hours or 1000 mg every 8 hours for added relief.  You can apply ice or heat  to the area for 10 minutes 3 to 4 times a day to reduce pain.    Follow up with Sports Medicine/Ortho clinic if your symptoms do not improve.   Return to the ER for new or worsening symptoms such as fever greater than 100.4° or any other new or worsening symptoms or other urgent concerns.

## 2023-11-26 NOTE — PLAN OF CARE
SW was asked by RN if she could pull a cane for pt, cane was ordered by PA.  SW spoke with pt and she currently does not have any DME.      SW pulled cane for pt from ODME room and provided pt with instructions.  Pt signed delivery ticket and instructions were given to pt.      Rosa Srinivasan CD, MSW, LMSW, RSW   Case Management  [facility]  Email: [email or web address]

## 2023-11-26 NOTE — Clinical Note
"Saskia "Bryantvivi Huang was seen and treated in our emergency department on 11/26/2023.  She may return to work on 11/28/2023.       If you have any questions or concerns, please don't hesitate to call.      Ame Padilla PA-C"

## 2024-01-12 ENCOUNTER — TELEPHONE (OUTPATIENT)
Dept: INTERNAL MEDICINE | Facility: CLINIC | Age: 44
End: 2024-01-12
Payer: COMMERCIAL

## 2024-01-12 DIAGNOSIS — Z12.31 SCREENING MAMMOGRAM, ENCOUNTER FOR: Primary | ICD-10-CM

## 2024-01-12 NOTE — TELEPHONE ENCOUNTER
----- Message from Anastasia Leblanc sent at 1/12/2024 10:05 AM CST -----  Contact: [phone]  Would like to receive medical advice.  Pt called and stated that she would like orders to be place to get a  Mammogram. Please call pt to advise.    Would they like a call back or a response via [facility]:  call    Additional information:  n/a

## 2024-02-05 ENCOUNTER — OFFICE VISIT (OUTPATIENT)
Dept: OBSTETRICS AND GYNECOLOGY | Facility: CLINIC | Age: 44
End: 2024-02-05
Payer: COMMERCIAL

## 2024-02-05 VITALS
HEIGHT: 67 IN | WEIGHT: 250.88 LBS | BODY MASS INDEX: 39.38 KG/M2 | SYSTOLIC BLOOD PRESSURE: 128 MMHG | DIASTOLIC BLOOD PRESSURE: 88 MMHG

## 2024-02-05 DIAGNOSIS — Z12.31 VISIT FOR SCREENING MAMMOGRAM: ICD-10-CM

## 2024-02-05 DIAGNOSIS — E66.01 CLASS 3 SEVERE OBESITY DUE TO EXCESS CALORIES WITH SERIOUS COMORBIDITY AND BODY MASS INDEX (BMI) OF 40.0 TO 44.9 IN ADULT: ICD-10-CM

## 2024-02-05 DIAGNOSIS — Z11.3 SCREENING EXAMINATION FOR STD (SEXUALLY TRANSMITTED DISEASE): ICD-10-CM

## 2024-02-05 DIAGNOSIS — I10 BENIGN ESSENTIAL HYPERTENSION: ICD-10-CM

## 2024-02-05 DIAGNOSIS — Z01.419 ENCOUNTER FOR GYNECOLOGICAL EXAMINATION WITHOUT ABNORMAL FINDING: Primary | ICD-10-CM

## 2024-02-05 PROCEDURE — 99999 PR PBB SHADOW E&M-EST. PATIENT-LVL III: CPT | Mod: PBBFAC,,, | Performed by: OBSTETRICS & GYNECOLOGY

## 2024-02-05 PROCEDURE — 3074F SYST BP LT 130 MM HG: CPT | Mod: CPTII,S$GLB,, | Performed by: OBSTETRICS & GYNECOLOGY

## 2024-02-05 PROCEDURE — 88175 CYTOPATH C/V AUTO FLUID REDO: CPT | Performed by: PATHOLOGY

## 2024-02-05 PROCEDURE — 99386 PREV VISIT NEW AGE 40-64: CPT | Mod: S$GLB,,, | Performed by: OBSTETRICS & GYNECOLOGY

## 2024-02-05 PROCEDURE — 1160F RVW MEDS BY RX/DR IN RCRD: CPT | Mod: CPTII,S$GLB,, | Performed by: OBSTETRICS & GYNECOLOGY

## 2024-02-05 PROCEDURE — 88141 CYTOPATH C/V INTERPRET: CPT | Mod: ,,, | Performed by: PATHOLOGY

## 2024-02-05 PROCEDURE — 3079F DIAST BP 80-89 MM HG: CPT | Mod: CPTII,S$GLB,, | Performed by: OBSTETRICS & GYNECOLOGY

## 2024-02-05 PROCEDURE — 87624 HPV HI-RISK TYP POOLED RSLT: CPT | Performed by: OBSTETRICS & GYNECOLOGY

## 2024-02-05 PROCEDURE — 1159F MED LIST DOCD IN RCRD: CPT | Mod: CPTII,S$GLB,, | Performed by: OBSTETRICS & GYNECOLOGY

## 2024-02-05 PROCEDURE — 87491 CHLMYD TRACH DNA AMP PROBE: CPT | Performed by: OBSTETRICS & GYNECOLOGY

## 2024-02-05 PROCEDURE — 3008F BODY MASS INDEX DOCD: CPT | Mod: CPTII,S$GLB,, | Performed by: OBSTETRICS & GYNECOLOGY

## 2024-02-05 NOTE — PROGRESS NOTES
HISTORY OF PRESENT ILLNESS:    Saskia Huang is a 43 y.o. female, , No LMP recorded.,  presents for a routine exam and has no complaints.  Patient reports cycles occur every 4 weeks lasting 7 days using 3-4 pads per day.   She denies any BTB.     History of abnormal pap: No  Last Pap:   Last MMG: N/A  Last Colonoscopy: N/A     She uses no for birth control.   She does not desire STD screening.    The patient participates in regular exercise: yes.    The patient does not smoke.    The patient wears seatbelts.     Pt denies any domestic violence.    Past Medical History:   Diagnosis Date    Benign essential hypertension 03/15/2018    History of cervical cerclage     Hypertension        History reviewed. No pertinent surgical history.    MEDICATIONS AND ALLERGIES:      Current Outpatient Medications:     amLODIPine (NORVASC) 5 MG tablet, Take 1 tablet by mouth once daily, Disp: 90 tablet, Rfl: 2    ergocalciferol (ERGOCALCIFEROL) 50,000 unit Cap, Take 1 capsule (50,000 Units total) by mouth every 7 days., Disp: 12 capsule, Rfl: 3    hydroCHLOROthiazide (HYDRODIURIL) 25 MG tablet, Take 1 tablet by mouth once daily, Disp: 90 tablet, Rfl: 2    meloxicam (MOBIC) 7.5 MG tablet, Take 1 tablet (7.5 mg total) by mouth daily as needed for Pain., Disp: 30 tablet, Rfl: 0    naproxen (NAPROSYN) 500 MG tablet, Take 1 tablet (500 mg total) by mouth 2 (two) times daily as needed. Take with food, Disp: 20 tablet, Rfl: 0    triamcinolone (KENALOG) 0.5 % ointment, Apply topically 2 (two) times daily. Use on affected dry skin around your ears, Disp: 15 g, Rfl: 0    fluticasone propionate (FLONASE) 50 mcg/actuation nasal spray, 1 spray (50 mcg total) by Each Nostril route once daily. (Patient not taking: Reported on 2024), Disp: 16 g, Rfl: 2    LIDOcaine (LIDODERM) 5 %, Place 1 patch onto the skin once daily. Remove & Discard patch within 12 hours or as directed by MD (Patient not taking: Reported on 2024), Disp: 7  patch, Rfl: 0    triamcinolone acetonide 0.1% (KENALOG) 0.1 % cream, Apply topically 2 (two) times daily. (Patient not taking: Reported on 2/5/2024), Disp: 45 g, Rfl: 0    Review of patient's allergies indicates:  No Known Allergies    Family History   Problem Relation Age of Onset    Hypertension Mother     No Known Problems Father     Diabetes Paternal Grandmother     Depression Neg Hx     Hearing loss Neg Hx     Melanoma Neg Hx        Social History     Socioeconomic History    Marital status: Single   Tobacco Use    Smoking status: Never    Smokeless tobacco: Never   Substance and Sexual Activity    Alcohol use: Yes     Comment: occasional, on holidays    Drug use: No    Sexual activity: Yes     Partners: Male     Birth control/protection: None   Social History Narrative    Works as outpatient        COMPREHENSIVE GYN HISTORY:  PAP History: Denies abnormal Paps.  Infection History: Denies STDs. Denies PID.  Benign History: Denies uterine fibroids. Denies ovarian cysts. Denies endometriosis. Denies other conditions.  Cancer History: Denies cervical cancer. Denies uterine cancer or hyperplasia. Denies ovarian cancer. Denies vulvar cancer or pre-cancer. Denies vaginal cancer or pre-cancer. Denies breast cancer. Denies colon cancer.  Sexual Activity History: Reports currently being sexually active  Menstrual History: Monthly. Mod then light flow.   Dysmenorrhea History: Reports mild dysmenorrhea.       ROS:  GENERAL: No weight changes. No swelling. No fatigue. No fever.  CARDIOVASCULAR: No chest pain. No shortness of breath. No leg cramps.   NEUROLOGICAL: No headaches. No vision changes.  BREASTS: No pain. No lumps. No discharge.  ABDOMEN: No pain. No nausea. No vomiting. No diarrhea. No constipation.  REPRODUCTIVE: No abnormal bleeding.   VULVA: No pain. No lesions. No itching.  VAGINA: No relaxation. No itching. No odor. No discharge. No lesions.  URINARY: No incontinence. No nocturia. No frequency. No  "dysuria.    /88 (BP Location: Right arm, Patient Position: Sitting)   Ht 5' 7" (1.702 m)   Wt 113.8 kg (250 lb 14.1 oz)   BMI 39.29 kg/m²     PE:  APPEARANCE: Well nourished, well developed, in no acute distress.  AFFECT: WNL, alert and oriented x 3.  SKIN: No acne or hirsutism.  NECK: Neck symmetric, without masses or thyromegaly.  NODES: No inguinal, cervical, axillary or femoral lymph node enlargement.  CHEST: Good respiratory effort.   ABDOMEN: Soft. No tenderness or masses. No hepatosplenomegaly. No hernias.  BREASTS: Symmetrical, no skin changes, visible lesions, palpable masses or nipple discharge bilaterally.  PELVIC: External female genitalia without lesions.  Female hair distribution. Adequate perineal body, Normal urethral meatus. Vagina moist and well rugated without lesions or discharge.  No significant cystocele or rectocele present. Cervix pink without lesions, discharge or tenderness. Uterus is 4-6 week size, regular, mobile and nontender. Adnexa without masses or tenderness.  EXTREMITIES: No edema    DIAGNOSIS:  1. Encounter for gynecological examination without abnormal finding    2. Visit for screening mammogram    3. Benign essential hypertension    4. Class 3 severe obesity due to excess calories with serious comorbidity and body mass index (BMI) of 40.0 to 44.9 in adult      PLAN:    COUNSELING:  The patient was counseled today on:  -A.C.S. Pap and pelvic exam guidelines (pap every 3 years), recomendations for yearly mammogram;  -to follow up with her PCP for other health maintenance.    FOLLOW-UP with me annually.   "

## 2024-02-08 LAB
C TRACH DNA SPEC QL NAA+PROBE: NOT DETECTED
N GONORRHOEA DNA SPEC QL NAA+PROBE: NOT DETECTED

## 2024-02-09 LAB
FINAL PATHOLOGIC DIAGNOSIS: ABNORMAL
Lab: ABNORMAL

## 2024-02-14 ENCOUNTER — HOSPITAL ENCOUNTER (OUTPATIENT)
Dept: RADIOLOGY | Facility: HOSPITAL | Age: 44
Discharge: HOME | End: 2024-02-14
Attending: INTERNAL MEDICINE
Payer: COMMERCIAL

## 2024-02-14 VITALS — WEIGHT: 250 LBS | BODY MASS INDEX: 39.24 KG/M2 | HEIGHT: 67 IN

## 2024-02-14 DIAGNOSIS — Z12.31 SCREENING MAMMOGRAM, ENCOUNTER FOR: ICD-10-CM

## 2024-02-14 PROCEDURE — 77063 BREAST TOMOSYNTHESIS BI: CPT | Mod: 26,,, | Performed by: RADIOLOGY

## 2024-02-14 PROCEDURE — 77067 SCR MAMMO BI INCL CAD: CPT | Mod: TC

## 2024-02-14 PROCEDURE — 77067 SCR MAMMO BI INCL CAD: CPT | Mod: 26,,, | Performed by: RADIOLOGY

## 2024-02-21 LAB
HPV HR 12 DNA SPEC QL NAA+PROBE: NEGATIVE
HPV16 AG SPEC QL: NEGATIVE
HPV18 DNA SPEC QL NAA+PROBE: NEGATIVE

## 2024-04-15 ENCOUNTER — LAB VISIT (OUTPATIENT)
Dept: LAB | Facility: HOSPITAL | Age: 44
End: 2024-04-15
Attending: INTERNAL MEDICINE
Payer: COMMERCIAL

## 2024-04-15 ENCOUNTER — OFFICE VISIT (OUTPATIENT)
Dept: INTERNAL MEDICINE | Facility: CLINIC | Age: 44
End: 2024-04-15
Payer: COMMERCIAL

## 2024-04-15 VITALS
HEART RATE: 73 BPM | WEIGHT: 254.88 LBS | SYSTOLIC BLOOD PRESSURE: 112 MMHG | HEIGHT: 67 IN | DIASTOLIC BLOOD PRESSURE: 78 MMHG | OXYGEN SATURATION: 99 % | BODY MASS INDEX: 40 KG/M2

## 2024-04-15 DIAGNOSIS — Z00.00 VISIT FOR ANNUAL HEALTH EXAMINATION: Primary | ICD-10-CM

## 2024-04-15 DIAGNOSIS — M54.50 CHRONIC BILATERAL LOW BACK PAIN WITHOUT SCIATICA: ICD-10-CM

## 2024-04-15 DIAGNOSIS — I10 BENIGN ESSENTIAL HYPERTENSION: ICD-10-CM

## 2024-04-15 DIAGNOSIS — G89.29 CHRONIC BILATERAL LOW BACK PAIN WITHOUT SCIATICA: ICD-10-CM

## 2024-04-15 DIAGNOSIS — Z00.00 VISIT FOR ANNUAL HEALTH EXAMINATION: ICD-10-CM

## 2024-04-15 DIAGNOSIS — E66.01 CLASS 3 SEVERE OBESITY DUE TO EXCESS CALORIES WITH SERIOUS COMORBIDITY AND BODY MASS INDEX (BMI) OF 40.0 TO 44.9 IN ADULT: ICD-10-CM

## 2024-04-15 DIAGNOSIS — N76.0 BACTERIAL VAGINOSIS: ICD-10-CM

## 2024-04-15 DIAGNOSIS — L70.9 ACNE, UNSPECIFIED ACNE TYPE: ICD-10-CM

## 2024-04-15 DIAGNOSIS — E55.9 VITAMIN D DEFICIENCY: ICD-10-CM

## 2024-04-15 DIAGNOSIS — M54.9 MID BACK PAIN ON RIGHT SIDE: ICD-10-CM

## 2024-04-15 DIAGNOSIS — B96.89 BACTERIAL VAGINOSIS: ICD-10-CM

## 2024-04-15 LAB
25(OH)D3+25(OH)D2 SERPL-MCNC: 18 NG/ML (ref 30–96)
ALBUMIN SERPL BCP-MCNC: 3.5 G/DL (ref 3.5–5.2)
ALP SERPL-CCNC: 91 U/L (ref 55–135)
ALT SERPL W/O P-5'-P-CCNC: 12 U/L (ref 10–44)
ANION GAP SERPL CALC-SCNC: 8 MMOL/L (ref 8–16)
AST SERPL-CCNC: 17 U/L (ref 10–40)
BASOPHILS # BLD AUTO: 0.04 K/UL (ref 0–0.2)
BASOPHILS NFR BLD: 0.5 % (ref 0–1.9)
BILIRUB SERPL-MCNC: 1.6 MG/DL (ref 0.1–1)
BUN SERPL-MCNC: 13 MG/DL (ref 6–20)
CALCIUM SERPL-MCNC: 9.5 MG/DL (ref 8.7–10.5)
CHLORIDE SERPL-SCNC: 104 MMOL/L (ref 95–110)
CHOLEST SERPL-MCNC: 174 MG/DL (ref 120–199)
CHOLEST/HDLC SERPL: 3.7 {RATIO} (ref 2–5)
CO2 SERPL-SCNC: 26 MMOL/L (ref 23–29)
CREAT SERPL-MCNC: 0.9 MG/DL (ref 0.5–1.4)
DIFFERENTIAL METHOD BLD: NORMAL
EOSINOPHIL # BLD AUTO: 0.1 K/UL (ref 0–0.5)
EOSINOPHIL NFR BLD: 0.7 % (ref 0–8)
ERYTHROCYTE [DISTWIDTH] IN BLOOD BY AUTOMATED COUNT: 13.2 % (ref 11.5–14.5)
EST. GFR  (NO RACE VARIABLE): >60 ML/MIN/1.73 M^2
ESTIMATED AVG GLUCOSE: 103 MG/DL (ref 68–131)
GLUCOSE SERPL-MCNC: 85 MG/DL (ref 70–110)
HBA1C MFR BLD: 5.2 % (ref 4–5.6)
HCT VFR BLD AUTO: 38.5 % (ref 37–48.5)
HDLC SERPL-MCNC: 47 MG/DL (ref 40–75)
HDLC SERPL: 27 % (ref 20–50)
HGB BLD-MCNC: 12.7 G/DL (ref 12–16)
IMM GRANULOCYTES # BLD AUTO: 0.03 K/UL (ref 0–0.04)
IMM GRANULOCYTES NFR BLD AUTO: 0.4 % (ref 0–0.5)
LDLC SERPL CALC-MCNC: 115.2 MG/DL (ref 63–159)
LYMPHOCYTES # BLD AUTO: 2.7 K/UL (ref 1–4.8)
LYMPHOCYTES NFR BLD: 32.7 % (ref 18–48)
MCH RBC QN AUTO: 28.3 PG (ref 27–31)
MCHC RBC AUTO-ENTMCNC: 33 G/DL (ref 32–36)
MCV RBC AUTO: 86 FL (ref 82–98)
MONOCYTES # BLD AUTO: 0.7 K/UL (ref 0.3–1)
MONOCYTES NFR BLD: 8.3 % (ref 4–15)
NEUTROPHILS # BLD AUTO: 4.8 K/UL (ref 1.8–7.7)
NEUTROPHILS NFR BLD: 57.4 % (ref 38–73)
NONHDLC SERPL-MCNC: 127 MG/DL
NRBC BLD-RTO: 0 /100 WBC
PLATELET # BLD AUTO: 266 K/UL (ref 150–450)
PMV BLD AUTO: 11.5 FL (ref 9.2–12.9)
POTASSIUM SERPL-SCNC: 4.1 MMOL/L (ref 3.5–5.1)
PROT SERPL-MCNC: 7.8 G/DL (ref 6–8.4)
RBC # BLD AUTO: 4.49 M/UL (ref 4–5.4)
SODIUM SERPL-SCNC: 138 MMOL/L (ref 136–145)
TRIGL SERPL-MCNC: 59 MG/DL (ref 30–150)
TSH SERPL DL<=0.005 MIU/L-ACNC: 1.47 UIU/ML (ref 0.4–4)
WBC # BLD AUTO: 8.36 K/UL (ref 3.9–12.7)

## 2024-04-15 PROCEDURE — 3074F SYST BP LT 130 MM HG: CPT | Mod: CPTII,S$GLB,, | Performed by: INTERNAL MEDICINE

## 2024-04-15 PROCEDURE — 80053 COMPREHEN METABOLIC PANEL: CPT | Performed by: INTERNAL MEDICINE

## 2024-04-15 PROCEDURE — 84443 ASSAY THYROID STIM HORMONE: CPT | Performed by: INTERNAL MEDICINE

## 2024-04-15 PROCEDURE — 99396 PREV VISIT EST AGE 40-64: CPT | Mod: S$GLB,,, | Performed by: INTERNAL MEDICINE

## 2024-04-15 PROCEDURE — 1159F MED LIST DOCD IN RCRD: CPT | Mod: CPTII,S$GLB,, | Performed by: INTERNAL MEDICINE

## 2024-04-15 PROCEDURE — 80061 LIPID PANEL: CPT | Performed by: INTERNAL MEDICINE

## 2024-04-15 PROCEDURE — 83036 HEMOGLOBIN GLYCOSYLATED A1C: CPT | Performed by: INTERNAL MEDICINE

## 2024-04-15 PROCEDURE — 99999 PR PBB SHADOW E&M-EST. PATIENT-LVL V: CPT | Mod: PBBFAC,,, | Performed by: INTERNAL MEDICINE

## 2024-04-15 PROCEDURE — 3008F BODY MASS INDEX DOCD: CPT | Mod: CPTII,S$GLB,, | Performed by: INTERNAL MEDICINE

## 2024-04-15 PROCEDURE — 36415 COLL VENOUS BLD VENIPUNCTURE: CPT | Performed by: INTERNAL MEDICINE

## 2024-04-15 PROCEDURE — 1160F RVW MEDS BY RX/DR IN RCRD: CPT | Mod: CPTII,S$GLB,, | Performed by: INTERNAL MEDICINE

## 2024-04-15 PROCEDURE — 82306 VITAMIN D 25 HYDROXY: CPT | Performed by: INTERNAL MEDICINE

## 2024-04-15 PROCEDURE — 85025 COMPLETE CBC W/AUTO DIFF WBC: CPT | Performed by: INTERNAL MEDICINE

## 2024-04-15 PROCEDURE — 3078F DIAST BP <80 MM HG: CPT | Mod: CPTII,S$GLB,, | Performed by: INTERNAL MEDICINE

## 2024-04-15 RX ORDER — HYDROCHLOROTHIAZIDE 25 MG/1
TABLET ORAL
Qty: 90 TABLET | Refills: 3 | Status: SHIPPED | OUTPATIENT
Start: 2024-04-15

## 2024-04-15 RX ORDER — METRONIDAZOLE 500 MG/1
500 TABLET ORAL EVERY 12 HOURS
Qty: 14 TABLET | Refills: 0 | Status: SHIPPED | OUTPATIENT
Start: 2024-04-15 | End: 2024-04-22

## 2024-04-15 RX ORDER — AMLODIPINE BESYLATE 5 MG/1
TABLET ORAL
Qty: 90 TABLET | Refills: 3 | Status: SHIPPED | OUTPATIENT
Start: 2024-04-15

## 2024-04-15 RX ORDER — MELOXICAM 7.5 MG/1
7.5 TABLET ORAL DAILY PRN
Qty: 30 TABLET | Refills: 0 | Status: SHIPPED | OUTPATIENT
Start: 2024-04-15

## 2024-04-15 NOTE — Clinical Note
Pt asking about results of her pap in Feb, states no one contacted her, but I wasn't sure.  Looks like she had ASCUS and BV.  HPV was neg.  She does have discharge and foul smelling odor, so I'm sending in Metronidazole, COY. Thanks,  LN

## 2024-04-15 NOTE — PROGRESS NOTES
INTERNAL MEDICINE ESTABLISHED PATIENT VISIT NOTE    Subjective:     Chief Complaint: Annual Exam       Patient ID: Saskia Huang is a 43 y.o. female with HTN, hx migraines, chronic back pain (had ED visit in Nov for sciatic sx), Vit D def, last seen by me in Nov for   ER f/u and HTN, here today for annual exam.    Today states that her back has been bothering her, mostly mid R back.  States it feels like it is in the muscle.  No radiation.  Sciatic sx that were present before have not been bothering her.    States she has had difficulty c wt loss and feels like it is contributing to her back issues.  Has been working from home and admits she has not been active.    Also states she saw gyn in Feb but had questions regarding results.    Also requesting referral to Derm for her skin.    Past Medical History:  Past Medical History:   Diagnosis Date    Benign essential hypertension 03/15/2018    History of cervical cerclage     Hypertension        Home Medications:  Prior to Admission medications    Medication Sig Start Date End Date Taking? Authorizing Provider   amLODIPine (NORVASC) 5 MG tablet Take 1 tablet by mouth once daily 6/19/23   Pebbles Bernal MD   ergocalciferol (ERGOCALCIFEROL) 50,000 unit Cap Take 1 capsule (50,000 Units total) by mouth every 7 days. 11/12/23   Pebbles Bernal MD   fluticasone propionate (FLONASE) 50 mcg/actuation nasal spray 1 spray (50 mcg total) by Each Nostril route once daily.  Patient not taking: Reported on 2/5/2024 11/9/23   Pebbles Bernal MD   hydroCHLOROthiazide (HYDRODIURIL) 25 MG tablet Take 1 tablet by mouth once daily 6/19/23   Pebbles Bernal MD   LIDOcaine (LIDODERM) 5 % Place 1 patch onto the skin once daily. Remove & Discard patch within 12 hours or as directed by MD  Patient not taking: Reported on 2/5/2024 11/26/23   Ame Padilla PA-C   meloxicam (MOBIC) 7.5 MG tablet Take 1 tablet (7.5 mg total) by mouth daily as needed for Pain. 11/9/23   Pebbles Bernal MD   naproxen  (NAPROSYN) 500 MG tablet Take 1 tablet (500 mg total) by mouth 2 (two) times daily as needed. Take with food 11/26/23   Ame Padilla PA-C   triamcinolone (KENALOG) 0.5 % ointment Apply topically 2 (two) times daily. Use on affected dry skin around your ears 11/9/23   Pebbles Bernal MD   triamcinolone acetonide 0.1% (KENALOG) 0.1 % cream Apply topically 2 (two) times daily.  Patient not taking: Reported on 2/5/2024 3/9/23   Pebbles Bernal MD       Allergies:  Review of patient's allergies indicates:  No Known Allergies    Social History:  Social History     Tobacco Use    Smoking status: Never    Smokeless tobacco: Never   Substance Use Topics    Alcohol use: Yes     Comment: occasional, on holidays    Drug use: No        Review of Systems   Constitutional:  Negative for appetite change, chills, fatigue, fever and unexpected weight change.   HENT:  Negative for congestion, hearing loss and rhinorrhea.    Eyes:  Negative for pain and visual disturbance.   Respiratory:  Negative for cough, chest tightness, shortness of breath and wheezing.    Cardiovascular:  Negative for chest pain, palpitations and leg swelling.   Gastrointestinal:  Negative for abdominal distention and abdominal pain.   Endocrine: Negative for polydipsia and polyuria.   Genitourinary:  Negative for decreased urine volume, difficulty urinating, dysuria, hematuria and vaginal discharge.   Neurological:  Negative for weakness, numbness and headaches.   Psychiatric/Behavioral:  Negative for behavioral problems and confusion.          Health Maintenance:     Immunizations:   Influenza rec today, pt declined.  Risks and benefits were discussed with patient.  TDap 1/2020  COVID 8/2021, 9/2021, rec schedule Omicron booster.  Pt declined.     Cancer Screening:  PAP:  2/2024 [employer] hern[facility] Lopez ASCUS, shift in petra suggestive of BV, HPV neg.  Mammogram: 2/2024 benign  Colonoscopy: rec at 47    Objective:   /78 (BP Location: Left arm, Patient  "Position: Sitting, BP Method: Large (Manual))   Pulse 73   Ht 5' 7" (1.702 m)   Wt 115.6 kg (254 lb 13.6 oz)   LMP 03/20/2024   SpO2 99%   BMI 39.92 kg/m²        General: AAO x3, no apparent distress  HEENT: no cervical LAD, no thyroid masses appreciated  CV: RRR, no m/r/g  Pulm: Lungs CTAB, no crackles, no wheezes  Abd: s/NT/ND +BS  Extremities: no c/c/e    Labs:     Lab Results   Component Value Date    WBC 6.11 03/09/2023    HGB 13.3 03/09/2023    HCT 39.8 03/09/2023    MCV 85 03/09/2023     03/09/2023     Sodium   Date Value Ref Range Status   11/09/2023 141 136 - 145 mmol/L Final     Potassium   Date Value Ref Range Status   11/09/2023 3.5 3.5 - 5.1 mmol/L Final     Chloride   Date Value Ref Range Status   11/09/2023 104 95 - 110 mmol/L Final     CO2   Date Value Ref Range Status   11/09/2023 27 23 - 29 mmol/L Final     Glucose   Date Value Ref Range Status   11/09/2023 88 70 - 110 mg/dL Final     BUN   Date Value Ref Range Status   11/09/2023 11 6 - 20 mg/dL Final     Creatinine   Date Value Ref Range Status   11/09/2023 0.8 0.5 - 1.4 mg/dL Final     Calcium   Date Value Ref Range Status   11/09/2023 9.1 8.7 - 10.5 mg/dL Final     Total Protein   Date Value Ref Range Status   11/09/2023 7.4 6.0 - 8.4 g/dL Final     Albumin   Date Value Ref Range Status   11/09/2023 3.4 (L) 3.5 - 5.2 g/dL Final     Total Bilirubin   Date Value Ref Range Status   11/09/2023 0.9 0.1 - 1.0 mg/dL Final     Comment:     For infants and newborns, interpretation of results should be based  on gestational age, weight and in agreement with clinical  observations.    Premature Infant recommended reference ranges:  Up to 24 hours.............<8.0 mg/dL  Up to 48 hours............<12.0 mg/dL  3-5 days..................<15.0 mg/dL  6-29 days.................<15.0 mg/dL       Alkaline Phosphatase   Date Value Ref Range Status   11/09/2023 78 55 - 135 U/L Final     AST   Date Value Ref Range Status   11/09/2023 15 10 - 40 U/L " Final     ALT   Date Value Ref Range Status   11/09/2023 12 10 - 44 U/L Final     Anion Gap   Date Value Ref Range Status   11/09/2023 10 8 - 16 mmol/L Final     eGFR if    Date Value Ref Range Status   03/22/2022 >60.0 >60 mL/min/1.73 m^2 Final     eGFR if non    Date Value Ref Range Status   03/22/2022 >60.0 >60 mL/min/1.73 m^2 Final     Comment:     Calculation used to obtain the estimated glomerular filtration  rate (eGFR) is the CKD-EPI equation.        Lab Results   Component Value Date    HGBA1C 5.3 03/09/2023     Lab Results   Component Value Date    LDLCALC 131.4 03/09/2023     Lab Results   Component Value Date    TSH 1.690 11/09/2023         Assessment/Plan     Saskia was seen today for annual exam.    Diagnoses and all orders for this visit:    Visit for annual health examination  History and physical exam completed.  Health maintenance reviewed as above.  Labs today.  -     CBC Auto Differential; Future  -     Comprehensive Metabolic Panel; Future  -     Hemoglobin A1C; Future  -     Lipid Panel; Future  -     Vitamin D; Future  -     TSH; Future    Benign essential hypertension  at goal.  Cont current medications.  -     hydroCHLOROthiazide (HYDRODIURIL) 25 MG tablet; Take 1 tablet by mouth once daily  -     amLODIPine (NORVASC) 5 MG tablet; Take 1 tablet by mouth once daily    Mid back pain on right side  C/w musculoskeletal back pain.  Rec Mobic prn, stretching exercises, and wt loss.  Refill for mobic sent.    Class 3 severe obesity due to excess calories with serious comorbidity and body mass index (BMI) of 40.0 to 44.9 in adult  Counseled extensively on need for diet changes and daily exercise.  Discussed examples of healthy eating.  Recommend at least 30 minutes of exercise at least 5 days a week.    Introduced to IF  Refer to BM clinic  -     Ambulatory referral/consult to Bariatric/Obesity Medicine; Future    Bacterial vaginosis  Noted on last PAP, will tx  since clinical sx present  Rec f/u c gyn in one year since ASCUS on pap  -     metroNIDAZOLE (FLAGYL) 500 MG tablet; Take 1 tablet (500 mg total) by mouth every 12 (twelve) hours. for 7 days    Vitamin D deficiency  Taking weekly Ergocalciferol  Will repeat labs now  -     Vitamin D; Future    Acne, unspecified acne type  -     Ambulatory referral/consult to Dermatology; Future    Chronic bilateral low back pain without sciatica  As per HPI  Pain currently in mid back  Okay to refill mobic  -     meloxicam (MOBIC) 7.5 MG tablet; Take 1 tablet (7.5 mg total) by mouth daily as needed for Pain.    HM as above  RTC in 6 mos, sooner if needed.    Pebbles Bernal MD  Department of Internal Medicine - [facility]  04/15/2024

## 2024-04-15 NOTE — PATIENT INSTRUCTIONS
Intermittent Fasting 8pm to noon.  No sweeteners other than Stevia.  Black coffee or unsweetened tea is okay.    Eat 3 meals from noon to 8pm:  Noon, 3pm, 6:30pm.    Track all foods via My Fitness Pal buck.  Get a kitchen scale and weigh all foods.    Goal weight is 245 by 2 months.    Focus on protein, carbs, and fat.  Okay to lower Carbohydrate percentage on the buck.    Exercise 30 minutes a day, 5 days a week.    Mon, Tues: cardio/HIIT  Wed, Thurs: strength: upper body, total body and core.  Friday: off, take a walk  Sat: legs  Sunday: off, take a walk    Prep foods every weekend for the entire week.  Meal plan and enter foods for the entire week into the buck.

## 2024-04-17 DIAGNOSIS — E55.9 VITAMIN D DEFICIENCY: ICD-10-CM

## 2024-04-17 DIAGNOSIS — R17 ELEVATED BILIRUBIN: Primary | ICD-10-CM

## 2024-04-17 RX ORDER — ERGOCALCIFEROL 1.25 MG/1
50000 CAPSULE ORAL
Qty: 24 CAPSULE | Refills: 3 | Status: SHIPPED | OUTPATIENT
Start: 2024-04-18

## 2024-04-17 NOTE — PROGRESS NOTES
Vit D improved but suboptimal despite weekly ergo, will increase to twice weekly.  T bili slightly elevated, rec repeat in 2 mos, all other LFTs wnl  All other labs wnl

## 2024-05-06 ENCOUNTER — PATIENT MESSAGE (OUTPATIENT)
Dept: OBSTETRICS AND GYNECOLOGY | Facility: CLINIC | Age: 44
End: 2024-05-06
Payer: COMMERCIAL

## 2024-06-10 ENCOUNTER — PATIENT MESSAGE (OUTPATIENT)
Dept: INTERNAL MEDICINE | Facility: CLINIC | Age: 44
End: 2024-06-10
Payer: COMMERCIAL

## 2024-06-11 ENCOUNTER — TELEPHONE (OUTPATIENT)
Dept: BARIATRICS | Facility: CLINIC | Age: 44
End: 2024-06-11
Payer: COMMERCIAL

## 2024-06-17 ENCOUNTER — LAB VISIT (OUTPATIENT)
Dept: LAB | Facility: HOSPITAL | Age: 44
End: 2024-06-17
Attending: INTERNAL MEDICINE
Payer: COMMERCIAL

## 2024-06-17 DIAGNOSIS — R17 ELEVATED BILIRUBIN: ICD-10-CM

## 2024-06-17 LAB
ALBUMIN SERPL BCP-MCNC: 3.3 G/DL (ref 3.5–5.2)
ALP SERPL-CCNC: 77 U/L (ref 55–135)
ALT SERPL W/O P-5'-P-CCNC: 12 U/L (ref 10–44)
AST SERPL-CCNC: 15 U/L (ref 10–40)
BILIRUB DIRECT SERPL-MCNC: 0.4 MG/DL (ref 0.1–0.3)
BILIRUB SERPL-MCNC: 1.2 MG/DL (ref 0.1–1)
PROT SERPL-MCNC: 7 G/DL (ref 6–8.4)

## 2024-06-17 PROCEDURE — 80076 HEPATIC FUNCTION PANEL: CPT | Performed by: INTERNAL MEDICINE

## 2024-06-17 PROCEDURE — 36415 COLL VENOUS BLD VENIPUNCTURE: CPT | Performed by: INTERNAL MEDICINE

## 2024-10-07 ENCOUNTER — TELEPHONE (OUTPATIENT)
Dept: INTERNAL MEDICINE | Facility: CLINIC | Age: 44
End: 2024-10-07
Payer: COMMERCIAL

## 2024-10-07 NOTE — TELEPHONE ENCOUNTER
----- Message from Eze sent at 10/7/2024 12:48 PM CDT -----  Type:  Same Day Appointment Request    Caller is requesting a same day appointment.  Caller declined first available appointment listed below.    Name of Caller:pt  When is the first available appointment?11/5  Symptoms:Symptoms: Knee Pain - Not From Injury, Leg Pain - Not From Injury  Outcome: Schedule an appointment to be seen within 24 hours.  Reason: Caller denied all higher acuity questions    The caller accepted this outcome.  Best Call Back Number:  [phone]  Additional Information: pt states she is okay with waiting to be seen, she would like to be seen sometime this week if possible. Thank you

## 2024-10-08 ENCOUNTER — OFFICE VISIT (OUTPATIENT)
Dept: DERMATOLOGY | Facility: CLINIC | Age: 44
End: 2024-10-08
Payer: COMMERCIAL

## 2024-10-08 ENCOUNTER — TELEPHONE (OUTPATIENT)
Dept: PAIN MEDICINE | Facility: CLINIC | Age: 44
End: 2024-10-08
Payer: COMMERCIAL

## 2024-10-08 DIAGNOSIS — L70.9 ACNE, UNSPECIFIED ACNE TYPE: ICD-10-CM

## 2024-10-08 DIAGNOSIS — L21.9 SEBORRHEA: ICD-10-CM

## 2024-10-08 DIAGNOSIS — L68.0 HIRSUTISM: ICD-10-CM

## 2024-10-08 DIAGNOSIS — Z76.89 ENCOUNTER FOR SKIN CARE: Primary | ICD-10-CM

## 2024-10-08 PROCEDURE — 99999 PR PBB SHADOW E&M-EST. PATIENT-LVL III: CPT | Mod: PBBFAC,,, | Performed by: DERMATOLOGY

## 2024-10-08 PROCEDURE — G2211 COMPLEX E/M VISIT ADD ON: HCPCS | Mod: S$GLB,,, | Performed by: DERMATOLOGY

## 2024-10-08 PROCEDURE — 1160F RVW MEDS BY RX/DR IN RCRD: CPT | Mod: CPTII,S$GLB,, | Performed by: DERMATOLOGY

## 2024-10-08 PROCEDURE — 99204 OFFICE O/P NEW MOD 45 MIN: CPT | Mod: S$GLB,,, | Performed by: DERMATOLOGY

## 2024-10-08 PROCEDURE — 1159F MED LIST DOCD IN RCRD: CPT | Mod: CPTII,S$GLB,, | Performed by: DERMATOLOGY

## 2024-10-08 PROCEDURE — 3044F HG A1C LEVEL LT 7.0%: CPT | Mod: CPTII,S$GLB,, | Performed by: DERMATOLOGY

## 2024-10-08 RX ORDER — KETOCONAZOLE 20 MG/G
CREAM TOPICAL 2 TIMES DAILY
Qty: 30 G | Refills: 5 | Status: SHIPPED | OUTPATIENT
Start: 2024-10-08

## 2024-10-08 RX ORDER — TRETINOIN 0.5 MG/G
CREAM TOPICAL NIGHTLY
Qty: 20 G | Refills: 5 | Status: SHIPPED | OUTPATIENT
Start: 2024-10-08

## 2024-10-08 NOTE — PROGRESS NOTES
Subjective:      Patient ID:  Saskia Huang is a 44 y.o. female who presents for   Chief Complaint   Patient presents with    Acne     Acne - Initial  Affected locations: face  Duration: 5 months  Severity: mild to moderate  Timing: constant  Aggravated by: nothing  Relieving factors/Treatments tried: nothing        Review of Systems   Constitutional: Negative.    HENT: Negative.     Respiratory: Negative.     Musculoskeletal: Negative.    Skin:  Positive for rash.       Objective:   Physical Exam   Constitutional: She appears well-developed and well-nourished.   Neurological: She is alert and oriented to person, place, and time.   Psychiatric: She has a normal mood and affect.   Skin:               Diagram Legend     Erythematous scaling macule/papule c/w actinic keratosis       Vascular papule c/w angioma      Pigmented verrucoid papule/plaque c/w seborrheic keratosis      Yellow umbilicated papule c/w sebaceous hyperplasia      Irregularly shaped tan macule c/w lentigo     1-2 mm smooth white papules consistent with Milia      Movable subcutaneous cyst with punctum c/w epidermal inclusion cyst      Subcutaneous movable cyst c/w pilar cyst      Firm pink to brown papule c/w dermatofibroma      Pedunculated fleshy papule(s) c/w skin tag(s)      Evenly pigmented macule c/w junctional nevus     Mildly variegated pigmented, slightly irregular-bordered macule c/w mildly atypical nevus      Flesh colored to evenly pigmented papule c/w intradermal nevus       Pink pearly papule/plaque c/w basal cell carcinoma      Erythematous hyperkeratotic cursted plaque c/w SCC      Surgical scar with no sign of skin cancer recurrence      Open and closed comedones      Inflammatory papules and pustules      Verrucoid papule consistent consistent with wart     Erythematous eczematous patches and plaques     Dystrophic onycholytic nail with subungual debris c/w onychomycosis     Umbilicated papule    Erythematous-base  heme-crusted tan verrucoid plaque consistent with inflamed seborrheic keratosis     Erythematous Silvery Scaling Plaque c/w Psoriasis     See annotation  Neck and chin with hairs.    Assessment / Plan:        Encounter for skin care  No hot water bathing reviewed.  Discussed with the patient gentle shaving techniques with lukewarm water and organic coconut oil.  Downward shaving direction.  Consider electric shaver for less irritation.  Pt reports oily skin.    Acne, unspecified acne type  -     Ambulatory referral/consult to Dermatology  -     tretinoin (RETIN-A) 0.05 % cream; Apply topically every evening. Start with every other night and move up to nightly after 2 weeks if not too dry.  Dispense: 20 g; Refill: 5  Long term and slow treatment treatment required for acne discussed today.  Gentle skin care with avoidance of hot water and usage of oil free products discussed.  Avoidance of lotions and make up discussed in addition to all other products.  Oil free sun block if needed.  Try to use clothing and hats to avoid extra products occluding the pores.  Compliance with prescribed regimen discussed.  No picking or squeezing of acne lesions discussed.  Focal coconut oil or jojoba oil as needed for dry areas.  Hold acne topicals if skin is getting too dry.  Resume when ready.  [facility] and or records and notes reviewed and considered for their impact on our clinical decision making today.  Reviewed with patient different treatment options and associated risks.  Proper application of medications and or care for affected area(s) and condition(s) reviewed.  Instructed patient to use mild soaps like glycerin bar soap for washing the face and body.  Can also consider avoiding soap on body except for armpits, groin, and soles if suffering from eczema.    Hirsutism  Consider laser hair removal.  Discussed multiple sessions required with realistic goal of 60% decreased density over a period of about a year or  more.  Recommended to seek services at [facility] in [facility] with Dr. Rivera or with board certified dermatologist or plastic surgeon.    Reviewed options for laser hair removal, potential amounts of decreased hair density, potential number of needed sessions, suitable offices with board certified dermatologists and or plastic surgeons to provide such services.  Also discussed electrolysis and how it works.      No plucking of hairs.  Brochure given for patient education.  Can try saw palmetto 500 mg to 1000 mg per day.  Prefer organic version.  No pregnancy with medications reviewed.    Seborrhea  -     ketoconazole (NIZORAL) 2 % cream; Apply topically 2 (two) times daily. Prn nose rash  Dispense: 30 g; Refill: 5  Reviewed with patient different treatment options and associated risks.  No hot water bathing reviewed.  Seborrheic dermatitis is an ongoing (chronic) condition. It can go away and then come back. You will likely need to use shampoo, cream, or ointment with medicine once or twice a week. This can help to keep symptoms from coming back or getting worse.  Reviewed with patient different treatment options and associated risks.  Proper application of medications and or care for affected area(s) and condition(s) reviewed.      Oily skin  RA should help with this.             Follow up in about 1 year (around 10/8/2025).

## 2024-10-08 NOTE — PATIENT INSTRUCTIONS
Can try saw palmetto 500 mg to 1000 mg per day.  Prefer organic version.      Start with applying Retin-a every other night and move up to nightly after 2 weeks if not too dry.    Long term and slow treatment treatment required for acne discussed today.  Gentle skin care with avoidance of hot water and usage of oil free products discussed.  Avoidance of lotions and make up discussed in addition to all other products.  Oil free sun block if needed.  Try to use clothing and hats to avoid extra products occluding the pores.  Compliance with prescribed regimen discussed.  No picking or squeezing of acne lesions discussed.  Focal coconut oil or jojoba oil as needed for dry areas.  Hold acne topicals if skin is getting too dry.  Resume when ready.    Seborrheic dermatitis is an ongoing (chronic) condition. It can go away and then come back. You will likely need to use shampoo, cream, or ointment with medicine once or twice a week. This can help to keep symptoms from coming back or getting worse.    Ketoconazole cream as needed

## 2024-10-09 ENCOUNTER — OFFICE VISIT (OUTPATIENT)
Dept: PAIN MEDICINE | Facility: CLINIC | Age: 44
End: 2024-10-09
Payer: COMMERCIAL

## 2024-10-09 VITALS
RESPIRATION RATE: 18 BRPM | DIASTOLIC BLOOD PRESSURE: 86 MMHG | OXYGEN SATURATION: 100 % | WEIGHT: 254.88 LBS | TEMPERATURE: 98 F | SYSTOLIC BLOOD PRESSURE: 121 MMHG | BODY MASS INDEX: 39.92 KG/M2 | HEART RATE: 78 BPM

## 2024-10-09 DIAGNOSIS — E66.9 OBESITY (BMI 35.0-39.9 WITHOUT COMORBIDITY): ICD-10-CM

## 2024-10-09 DIAGNOSIS — M25.562 ACUTE PAIN OF LEFT KNEE: Primary | ICD-10-CM

## 2024-10-09 PROCEDURE — 99999 PR PBB SHADOW E&M-EST. PATIENT-LVL III: CPT | Mod: PBBFAC,,, | Performed by: ANESTHESIOLOGY

## 2024-10-09 PROCEDURE — 3079F DIAST BP 80-89 MM HG: CPT | Mod: CPTII,S$GLB,, | Performed by: ANESTHESIOLOGY

## 2024-10-09 PROCEDURE — 3008F BODY MASS INDEX DOCD: CPT | Mod: CPTII,S$GLB,, | Performed by: ANESTHESIOLOGY

## 2024-10-09 PROCEDURE — 99204 OFFICE O/P NEW MOD 45 MIN: CPT | Mod: S$GLB,,, | Performed by: ANESTHESIOLOGY

## 2024-10-09 PROCEDURE — 3074F SYST BP LT 130 MM HG: CPT | Mod: CPTII,S$GLB,, | Performed by: ANESTHESIOLOGY

## 2024-10-09 PROCEDURE — 3044F HG A1C LEVEL LT 7.0%: CPT | Mod: CPTII,S$GLB,, | Performed by: ANESTHESIOLOGY

## 2024-10-09 NOTE — PROGRESS NOTES
PCP: Pebbles Bernal MD    REFERRING PHYSICIAN: Luis Palacios    CHIEF COMPLAINT: left knee pain    Original HISTORY OF PRESENT ILLNESS: Saskia Huang presents to the clinic for the evaluation of the above pain. The pain started about 6 months ago but only occurs when patient goes from sit to stand.    Original Pain Description:  The pain is located in the left knee and neck and is axial in nature. The pain is described as aching and tight band. Exacerbating factors: going from sit to stand after sitting for a long time. Mitigating factors heat and medications. Symptoms interfere with daily activity, sleeping, and work. The patient feels like symptoms have been unchanged. Patient denies night fever/night sweats, urinary incontinence, bowel incontinence, significant weight loss, significant motor weakness, and loss of sensations.    Original PAIN SCORES:  Best: Pain is 0  Worst: Pain is 9  Current: Pain is 4         No data to display                INTERVAL HISTORY: (Newest visit at the bottom)   Interval History (Date):       6 weeks of Conservative therapy:  PT: none  Chiro:  HEP: none      Treatments / Medications: (Ice/Heat/NSAIDS/APAP/etc):  Meloxicam 7.5mg  Naproxen 500mg      Interventional Pain Procedures: (Previous injections)  none    Past Medical History:   Diagnosis Date    Benign essential hypertension 03/15/2018    History of cervical cerclage     Hypertension      History reviewed. No pertinent surgical history.  Social History     Socioeconomic History    Marital status: Single   Tobacco Use    Smoking status: Never    Smokeless tobacco: Never   Substance and Sexual Activity    Alcohol use: Yes     Comment: occasional, on holidays    Drug use: No    Sexual activity: Yes     Partners: Male     Birth control/protection: None   Social History Narrative    Works as outpatient      Social Drivers of Health     Financial Resource Strain: Medium Risk (5/29/2024)    Overall Financial Resource  Strain (CARDIA)     Difficulty of Paying Living Expenses: Somewhat hard   Food Insecurity: Food Insecurity Present (5/29/2024)    Hunger Vital Sign     Worried About Running Out of Food in the Last Year: Never true     Ran Out of Food in the Last Year: Sometimes true   Physical Activity: Unknown (5/29/2024)    Exercise Vital Sign     Minutes of Exercise per Session: 0 min   Stress: No Stress Concern Present (5/29/2024)    [employer] [place] [employer] - Occupational Stress Questionnaire     Feeling of Stress : Not at all   Housing Stability: High Risk (5/29/2024)    Housing Stability Vital Sign     Unable to Pay for Housing in the Last Year: Yes     Family History   Problem Relation Name Age of Onset    Hypertension Mother      No Known Problems Father      Diabetes Paternal Grandmother      Depression Neg Hx      Hearing loss Neg Hx      Melanoma Neg Hx         Review of patient's allergies indicates:  No Known Allergies    Current Outpatient Medications   Medication Sig    amLODIPine (NORVASC) 5 MG tablet Take 1 tablet by mouth once daily    ergocalciferol (ERGOCALCIFEROL) 50,000 unit Cap Take 1 capsule (50,000 Units total) by mouth twice a week.    hydroCHLOROthiazide (HYDRODIURIL) 25 MG tablet Take 1 tablet by mouth once daily    ketoconazole (NIZORAL) 2 % cream Apply topically 2 (two) times daily. Prn nose rash    meloxicam (MOBIC) 7.5 MG tablet Take 1 tablet (7.5 mg total) by mouth daily as needed for Pain.    naproxen (NAPROSYN) 500 MG tablet Take 1 tablet (500 mg total) by mouth 2 (two) times daily as needed. Take with food    tretinoin (RETIN-A) 0.05 % cream Apply topically every evening. Start with every other night and move up to nightly after 2 weeks if not too dry.    triamcinolone (KENALOG) 0.5 % ointment Apply topically 2 (two) times daily. Use on affected dry skin around your ears     No current facility-administered medications for this visit.       ROS:  GENERAL: No fever. No chills. No  fatigue. Denies weight loss. Denies weight gain.  HEENT: Denies headaches. Denies vision change. Denies eye pain. Denies double vision. Denies ear pain.   CV: Denies chest pain.   PULM: Denies of shortness of breath.  GI: Denies constipation. No diarrhea. No abdominal pain. Denies nausea. Denies vomiting. No blood in stool.  HEME: Denies bleeding problems.  : Denies urgency. No painful urination. No blood in urine.  MS: Denies joint stiffness. Denies joint swelling.  Denies back pain.  SKIN: Denies rash.   NEURO: Denies seizures. No weakness.  PSYCH:  Denies difficulty sleeping. No anxiety. Denies depression. No suicidal thoughts.       VITALS:   Vitals:    10/09/24 1617   BP: 121/86   Pulse: 78   Resp: 18   Temp: 98.3 °F (36.8 °C)   SpO2: 100%   Weight: 115.6 kg (254 lb 13.6 oz)   PainSc: 10-Worst pain ever   PainLoc: Neck         PHYSICAL EXAM:   GENERAL: Well appearing, in no acute distress, alert and oriented x3.  PSYCH:  Mood and affect appropriate.  SKIN: Skin color, texture, turgor normal, no rashes or lesions.  HEENT:  Normocephalic, atraumatic. Cranial nerves grossly intact.  NECK: No pain to palpation over the cervical paraspinous muscles. No pain to palpation over facets. No pain with neck flexion, extension, or lateral flexion. Mild pain over midline lower neck.  PULM: No evidence of respiratory difficulty, symmetric chest rise.  GI:  Non-distended  BACK: Normal range of motion. No pain to palpation over the spinous processes. No pain to palpation over facet joints. There is no pain with palpation over the sacroiliac joints bilaterally.   EXTREMITIES: No deformities, edema, or skin discoloration.   MUSCULOSKELETAL: Shoulder and hip provocative maneuvers are negative. No atrophy is noted.  LEFT Knee: No swelling. No pain to palpation of medial and lateral joint line. No pain with resisted extension. No crepitus.    NEURO: Sensation is equal and appropriate bilaterally. Bilateral upper and lower  extremity strength is normal and symmetric. Bilateral upper and lower extremity coordination and muscle stretch reflexes are physiologic and symmetric. Plantar response are downgoing. Straight leg raising in the supine position is negative to radicular pain.   GAIT: normal.      LABS:      IMAGING:    SHOULDER X-RAY  Results for orders placed during the hospital encounter of 03/22/22    X-Ray Shoulder 2 or More Views Left    Narrative  EXAMINATION:  XR SHOULDER COMPLETE 2 OR MORE VIEWS LEFT    CLINICAL HISTORY:  Pain in left shoulder    TECHNIQUE:  Two or three views of the left shoulder were performed.    COMPARISON:  None    FINDINGS:  The alignment is within normal limits.  No fracture.  No marrow replacement process.    Impression  No acute findings.      Electronically signed by: Toy Pringle MD  Date:    03/22/2022  Time:    11:55     HIP X-RAY  Results for orders placed during the hospital encounter of 11/26/23    X-Ray Hip 2 or 3 views Left (with Pelvis when performed)    Narrative  EXAMINATION:  XR HIP WITH PELVIS WHEN PERFORMED, 2 OR 3 VIEWS LEFT    CLINICAL HISTORY:  Pain in left hip    TECHNIQUE:  AP view of the pelvis and frog leg lateral view of the left hip were performed.    COMPARISON:  None    FINDINGS:  Three views left hip.    The bilateral sacroiliac joints are intact.  The pubic symphysis is intact.  The bilateral femoroacetabular joints are intact.  No radiopaque foreign body.    Impression  1. No acute displaced fracture or dislocation of the left hip.      Electronically signed by: Mark Salinas MD  Date:    11/26/2023  Time:    15:11     KNEE X-RAY  No results found for this or any previous visit.        ASSESSMENT: 44 y.o. year old female with pain, consistent with:    Encounter Diagnoses   Name Primary?    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 She has been sitting a lot with her job and reports gaining a significant amount of weight. She comes to us with 2 episodes of knee pain which is worst with sit to stand after prolonged sitting. Pain not reproduced on exam.       PLAN:  Referral to PT for knee pain and neck pain  Recommend continued weight loss and increased activity  We offered referral for weight loss assistance, which she declines at this time.   Continue OTC lidocaine patches as needed.  RTC 2-3 months for further workup if needed      Regina Avila  10/09/2024

## 2024-10-11 ENCOUNTER — OFFICE VISIT (OUTPATIENT)
Dept: PODIATRY | Facility: CLINIC | Age: 44
End: 2024-10-11
Payer: COMMERCIAL

## 2024-10-11 VITALS
SYSTOLIC BLOOD PRESSURE: 124 MMHG | BODY MASS INDEX: 40.55 KG/M2 | HEART RATE: 71 BPM | WEIGHT: 258.38 LBS | HEIGHT: 67 IN | DIASTOLIC BLOOD PRESSURE: 80 MMHG

## 2024-10-11 DIAGNOSIS — L60.9 DISEASE OF NAIL: Primary | ICD-10-CM

## 2024-10-11 DIAGNOSIS — B35.1 ONYCHOMYCOSIS DUE TO DERMATOPHYTE: ICD-10-CM

## 2024-10-11 PROCEDURE — 87101 SKIN FUNGI CULTURE: CPT | Performed by: PODIATRIST

## 2024-10-11 PROCEDURE — 99999 PR PBB SHADOW E&M-EST. PATIENT-LVL III: CPT | Mod: PBBFAC,,, | Performed by: PODIATRIST

## 2024-10-11 RX ORDER — CICLOPIROX 80 MG/ML
SOLUTION TOPICAL NIGHTLY
Qty: 6.6 ML | Refills: 1 | Status: SHIPPED | OUTPATIENT
Start: 2024-10-11

## 2024-10-11 NOTE — PROGRESS NOTES
Subjective:      Patient ID: Saskia Huang is a 44 y.o. female.    Chief Complaint:   Nail Care (Right foot great big toe and 5th digit toe toe discoloration/Left foot great big toe and 2nd, 4th and 5th digit toe discloration and thickness to toe naAIL)    Saskia is a 44 y.o. female who presents to the clinic complaining of thick and discolored toenails on both feet. aSskia is inquiring about treatment options.       Patient relates she has had nail changes for years;     Left foot toenail are discoloration started after an injury in 2008  She had her toenail removed preop podiatrist it never really re-attached incorrectly    The right big toenail became discolored over the last few years      Every month she had a pedicure with gel  Is usually no pain to the toenails    Patient relates she is ready to find a solution after seeing her primary care doctor    She did use a topical type medication over-the-counter did not seem to help      Past Medical History:   Diagnosis Date    Benign essential hypertension 03/15/2018    History of cervical cerclage     Hypertension      History reviewed. No pertinent surgical history.  Current Outpatient Medications on File Prior to Visit   Medication Sig Dispense Refill    amLODIPine (NORVASC) 5 MG tablet Take 1 tablet by mouth once daily 90 tablet 3    ergocalciferol (ERGOCALCIFEROL) 50,000 unit Cap Take 1 capsule (50,000 Units total) by mouth twice a week. 24 capsule 3    hydroCHLOROthiazide (HYDRODIURIL) 25 MG tablet Take 1 tablet by mouth once daily 90 tablet 3    ketoconazole (NIZORAL) 2 % cream Apply topically 2 (two) times daily. Prn nose rash 30 g 5    meloxicam (MOBIC) 7.5 MG tablet Take 1 tablet (7.5 mg total) by mouth daily as needed for Pain. 30 tablet 0    naproxen (NAPROSYN) 500 MG tablet Take 1 tablet (500 mg total) by mouth 2 (two) times daily as needed. Take with food 20 tablet 0    tretinoin (RETIN-A) 0.05 % cream Apply topically every evening. Start  "with every other night and move up to nightly after 2 weeks if not too dry. 20 g 5    triamcinolone (KENALOG) 0.5 % ointment Apply topically 2 (two) times daily. Use on affected dry skin around your ears 15 g 0     No current facility-administered medications on file prior to visit.     Review of patient's allergies indicates:  No Known Allergies    Review of Systems   Constitutional: Negative for chills, decreased appetite, fever, malaise/fatigue, night sweats, weight gain and weight loss.   Cardiovascular:  Negative for chest pain, claudication, dyspnea on exertion, leg swelling, palpitations and syncope.   Respiratory:  Negative for cough and shortness of breath.    Endocrine: Negative for cold intolerance and heat intolerance.   Hematologic/Lymphatic: Negative for bleeding problem. Does not bruise/bleed easily.   Skin:  Positive for dry skin and nail changes. Negative for color change, flushing, itching, poor wound healing, rash, skin cancer, suspicious lesions and unusual hair distribution.   Musculoskeletal:  Negative for arthritis, back pain, falls, gout, joint pain, joint swelling, muscle cramps, muscle weakness, myalgias, neck pain and stiffness.   Gastrointestinal:  Negative for diarrhea, nausea and vomiting.   Neurological:  Negative for dizziness, focal weakness, light-headedness, numbness, paresthesias, tremors, vertigo and weakness.   Psychiatric/Behavioral:  Negative for altered mental status and depression. The patient does not have insomnia.    Allergic/Immunologic: Negative.            Objective:       Vitals:    10/11/24 0939   BP: 124/80   Pulse: 71   Weight: 117.2 kg (258 lb 6.1 oz)   Height: 5' 7" (1.702 m)   PainSc: 0-No pain   PainLoc: Foot   117.2 kg (258 lb 6.1 oz)     Physical Exam  Vitals reviewed.   Constitutional:       General: She is not in acute distress.     Appearance: She is well-developed. She is not ill-appearing, toxic-appearing or diaphoretic.      Comments: Proper supportive " shoegear      Cardiovascular:      Pulses:           Dorsalis pedis pulses are 2+ on the right side and 2+ on the left side.        Posterior tibial pulses are 2+ on the right side and 2+ on the left side.   Musculoskeletal:         General: No tenderness or deformity.      Right lower leg: No edema.      Left lower leg: No edema.      Right ankle: Normal.      Right Achilles Tendon: Normal.      Left ankle: Normal.      Left Achilles Tendon: Normal.      Right foot: Decreased range of motion. No deformity.      Left foot: Decreased range of motion. No deformity.      Comments: No pain on palpation   Feet:      Right foot:      Protective Sensation: 10 sites tested.  10 sites sensed.      Skin integrity: Dry skin present. No ulcer, blister, skin breakdown, erythema, warmth or callus.      Toenail Condition: Right toenails are abnormally thick. Fungal disease present.     Left foot:      Protective Sensation: 10 sites tested.  10 sites sensed.      Skin integrity: Dry skin present. No ulcer, blister, skin breakdown, erythema, warmth or callus.      Toenail Condition: Left toenails are abnormally thick. Fungal disease present.     Comments: Bilateral hallux nails thickened crumbly distal half lysed no open lesion nail bed mild subungual debris with macerations    Left 2nd and 3rd digit as well as 5th digits bilaterally thickened discolored dystrophic    No acute signs of infection  Skin:     General: Skin is warm and dry.      Capillary Refill: Capillary refill takes 2 to 3 seconds.      Coloration: Skin is not pale.      Findings: No erythema or rash.   Neurological:      Mental Status: She is alert and oriented to person, place, and time.      Sensory: No sensory deficit.      Gait: Gait is intact.   Psychiatric:         Attention and Perception: Attention normal.         Mood and Affect: Mood normal.         Speech: Speech normal.         Behavior: Behavior normal.         Thought Content: Thought content normal.          Cognition and Memory: Cognition normal.         Judgment: Judgment normal.               Assessment:       Encounter Diagnoses   Name Primary?    Disease of nail Yes    Onychomycosis due to dermatophyte          Plan:       Saskia was seen today for nail care.    Diagnoses and all orders for this visit:    Disease of nail  -     Fungal culture , skin, hair, or nails    Onychomycosis due to dermatophyte  -     Fungal culture , skin, hair, or nails    Other orders  -     ciclopirox (PENLAC) 8 % Soln; Apply topically nightly. Remove once a week with nail polish remover      I counseled the patient on her conditions, their implications and medical management.    Discussed all options  Briefly discussed carried flex which has not offered at [facility] however would replace the nail cosmetically    Options of removal versus treatment recommend treating with topical and oral with nail culture      The area was cleansed with alcohol prep pad. With patient's permission, the affected toenail was  aggressively reduced back to the proximal matrix region using sterile nail nippers to the soft tissue attachment to obtain nail specimen. Patient tolerated well. No blood was drawn. The specimen was placed in a sterile specimen container and appropriately label with patient confirmation      Discussed proper nail care  Avoiding pedicures for now    Discuss treatment options for nail fungus.  I explained that fungus lives in a warm dark moist environment and therefore patient should make every attempt to keep feet clean and dry.  We discussed drying feet thoroughly after shower particularly between the toes and then applying powder between the toes and in the shoes.   For fungal toenails I prescribed Penlac to be used daily for up to a year.  We discussed oral Lamisil but I did not recommend it as a first line of treatment since it is an internal medicine that may potentially have side effects, including liver problems. Patient  elects for topical treatment. Patient instructed on proper use of Penlac.     Once nail culture results return in 3-4 weeks recommend oral Lamisil  Will call in a prescription  We discussed using Lamisil for onychomycosis. This drug offers a fairly high but not universal cure rate. A 12 week treatment course is recommended. The patient is aware that rare cases of liver injury have been reported; and agrees to come in for liver function tests at 4 weeks of treatment. The symptoms of liver disease have been discussed; call if such occurs. In addition, some insurance plans do not cover the expense of this drug for treating a cosmetic condition, and the patient understands they may have to pay for the medication. Other side effects, such as headaches and rashes, have also been discussed.     Reviewed patient's labs did have a high bilirubin AST and ALT are normal      .           Follow up in about 3 months (around 1/11/2025).

## 2024-10-14 ENCOUNTER — OFFICE VISIT (OUTPATIENT)
Dept: BARIATRICS | Facility: CLINIC | Age: 44
End: 2024-10-14
Payer: COMMERCIAL

## 2024-10-14 VITALS
SYSTOLIC BLOOD PRESSURE: 133 MMHG | BODY MASS INDEX: 40.78 KG/M2 | HEIGHT: 67 IN | DIASTOLIC BLOOD PRESSURE: 80 MMHG | WEIGHT: 259.81 LBS | OXYGEN SATURATION: 98 % | HEART RATE: 74 BPM

## 2024-10-14 DIAGNOSIS — E66.01 CLASS 3 SEVERE OBESITY DUE TO EXCESS CALORIES WITH SERIOUS COMORBIDITY AND BODY MASS INDEX (BMI) OF 40.0 TO 44.9 IN ADULT: Primary | ICD-10-CM

## 2024-10-14 DIAGNOSIS — E66.813 CLASS 3 SEVERE OBESITY DUE TO EXCESS CALORIES WITH SERIOUS COMORBIDITY AND BODY MASS INDEX (BMI) OF 40.0 TO 44.9 IN ADULT: Primary | ICD-10-CM

## 2024-10-14 DIAGNOSIS — I10 BENIGN ESSENTIAL HYPERTENSION: ICD-10-CM

## 2024-10-14 DIAGNOSIS — Z71.3 ENCOUNTER FOR WEIGHT LOSS COUNSELING: ICD-10-CM

## 2024-10-14 PROCEDURE — 3075F SYST BP GE 130 - 139MM HG: CPT | Mod: CPTII,S$GLB,, | Performed by: STUDENT IN AN ORGANIZED HEALTH CARE EDUCATION/TRAINING PROGRAM

## 2024-10-14 PROCEDURE — 1160F RVW MEDS BY RX/DR IN RCRD: CPT | Mod: CPTII,S$GLB,, | Performed by: STUDENT IN AN ORGANIZED HEALTH CARE EDUCATION/TRAINING PROGRAM

## 2024-10-14 PROCEDURE — 3008F BODY MASS INDEX DOCD: CPT | Mod: CPTII,S$GLB,, | Performed by: STUDENT IN AN ORGANIZED HEALTH CARE EDUCATION/TRAINING PROGRAM

## 2024-10-14 PROCEDURE — 3044F HG A1C LEVEL LT 7.0%: CPT | Mod: CPTII,S$GLB,, | Performed by: STUDENT IN AN ORGANIZED HEALTH CARE EDUCATION/TRAINING PROGRAM

## 2024-10-14 PROCEDURE — 99204 OFFICE O/P NEW MOD 45 MIN: CPT | Mod: S$GLB,,, | Performed by: STUDENT IN AN ORGANIZED HEALTH CARE EDUCATION/TRAINING PROGRAM

## 2024-10-14 PROCEDURE — 99999 PR PBB SHADOW E&M-EST. PATIENT-LVL III: CPT | Mod: PBBFAC,,, | Performed by: STUDENT IN AN ORGANIZED HEALTH CARE EDUCATION/TRAINING PROGRAM

## 2024-10-14 PROCEDURE — 1159F MED LIST DOCD IN RCRD: CPT | Mod: CPTII,S$GLB,, | Performed by: STUDENT IN AN ORGANIZED HEALTH CARE EDUCATION/TRAINING PROGRAM

## 2024-10-14 PROCEDURE — 3079F DIAST BP 80-89 MM HG: CPT | Mod: CPTII,S$GLB,, | Performed by: STUDENT IN AN ORGANIZED HEALTH CARE EDUCATION/TRAINING PROGRAM

## 2024-10-14 RX ORDER — SEMAGLUTIDE 1 MG/.5ML
1 INJECTION, SOLUTION SUBCUTANEOUS
Qty: 2 ML | Refills: 0 | Status: SHIPPED | OUTPATIENT
Start: 2024-12-09 | End: 2024-12-31

## 2024-10-14 RX ORDER — SEMAGLUTIDE 0.25 MG/.5ML
0.25 INJECTION, SOLUTION SUBCUTANEOUS
Qty: 2 ML | Refills: 0 | Status: SHIPPED | OUTPATIENT
Start: 2024-10-14 | End: 2024-11-05

## 2024-10-14 RX ORDER — SEMAGLUTIDE 0.5 MG/.5ML
0.5 INJECTION, SOLUTION SUBCUTANEOUS
Qty: 2 ML | Refills: 0 | Status: SHIPPED | OUTPATIENT
Start: 2024-11-11 | End: 2024-12-03

## 2024-10-14 NOTE — PATIENT INSTRUCTIONS
Start Wegovy 0.25 mg once a week x 4 weeks, then 0.5 mg once a week for 4 weeks, then 1 mg once a week x 4 weeks.    Decrease portions as soon as you start Wegovy. Avoid fried, greasy, fatty foods.     Some nausea in the first couple weeks is not unusual as your body adjusts to the medication.     If you experience persistent severe abdominal pain that radiates to your back, please call the office.             Copyright © 2011, [facility]. For more information about The Healthy Eating Plate, please see The Nutrition Source, Department of Nutrition, [employer], www.thenutritionsource.org, and [employer], www.health.[place].edu.      Meal Planning & Grocery Shopping    Meal planning builds the foundation for healthy eating. When you have structured ideas for healthy meals and foods available at home to prepare those meals, weight control becomes easier.  If only healthy foods are available at home, then you will be much more likely to eat healthy foods. And you will be less likely to go to a restaurant or  a fast food meal, which tend to be unhealthy and higher in calories than meals prepared at home.      Take 5-10 minutes each week to plan meals for the next 7 days.  Make a grocery list based on the meal plan.    Grocery Shopping Tips:  Shop on a full stomach.  Schedule your shopping for times when you are most motivated and able to be disciplined about your purchases. For example, after a stressful day at work it may be difficult to make the healthiest choices. Shopping at other times, such as early in the morning or after dinner, may be easier.  Focus your shopping on the outside aisles of the store, which tend to contain more fresh foods and lower calorie foods. The inside aisles tend to have more processed foods.  Stick to your list. Avoid buying unhealthy items just because they are on sale.   Compare nutrition labels to check the number of calories  and percentage of fat.      What to buy:    Vegetables  Fresh vegetables  Frozen vegetables with no sauce or added salt  Canned vegetables with no sauce or added salt    Protein  Lean meats, such as chicken and turkey  Limit red meats, such as beef to no more than 1x/week  Limit processed meats, such as cold cuts, rizzo, sausage, and hot dogs. Look for brands that have no nitrites and are minimally processed. Consider turkey sausage or turkey rizzo.  Fish and Shellfish  Eggs  Dried beans  Canned beans (reduced sodium)    Fat  Use healthy oils, such as olive oil or canola oil, for cooking, salad dressings, etc.  Unflavored nuts and seeds  Nut butters (no added sugar)    Dairy  Yogurt (no sugar added)  Cheese  Low-fat milk  Unsweetened nondairy milks (almond milk, soy milk, etc)    Fruit  Fresh Fruit  Frozen fruit with no added sugar  Canned fruit with no added sugar  Dried fruit with no added sugar  100% fruit juice    Whole Grains  Single ingredient grains, such as oats, quinoa, brown rice  Whole-wheat pasta  Sprouted whole-grain bread    What to avoid:  - Avoid fried foods  - Avoid foods with added sugar  - Avoid sugar-sweetened beverages  - Avoid ultra-processed foods      Lifestyle Activity    Lifestyle activity consists of all the activities that burn calories during the course of a normal day. Using the stairs, washing the dishes, or even getting up to turn off the television are all examples of lifestyle activity. All activities, no matter how small, burn calories. Increasing lifestyle activity can help with weight control, so building physical activity into your everyday routine is important.     A pedometer is a tool that can help you track how much lifestyle activity you are getting. It can help you stay active. A pedometer counts each step you take and displays your total steps on the screen. Many smartphones, including iPhones and Androids, have applications that automatically count your steps. If you  decide to use this method, make sure you are holding or wearing your smartphone so it can count all of your steps.     During the next 2 weeks, aim for 5,000 or more steps per day. Each week aim to increase your daily step goal by 250 so that you will reach an average of 10,000 steps per day (equal to walking 4 to 5 miles).     Start making more active choices in your routine in order to increase the amount of walking you do each day.     Strategies to Increase Lifestyle Activity:    Take several 5-10-minute walks during the day.   Set a reminder to take a 5 minute break from your desk every hour.   Choose the farthest entrance to a building that you are entering.   Host walking meetings at work.   Walk down the manzano to contact co-workers face-to-face, instead of emailing or calling.  Walk to a restroom, water cooler, or copy machine on a different floor at work.   Walk during your lunch break.   Park farther away in parking lots.   Get off the bus or train earlier and walk farther to your destination.   Take the stairs rather than the elevator or the escalator.   Start a walking club with co-workers or neighbors.   Walk - don't drive - for trips less than one mile.   Take an after-dinner walk with family.   Go for a walk while talking on your wireless phone.   Walk the dog more often.   If you prefer to stay indoors because of the weather, try walking in a shopping mall or doing laps around a large store.   Purchase a treadmill to use at home.   Schedule time for walking every week and stick to it like any other appointment.   Or think of your own strategy: _______________________________       Physical Activity    Physical activity improves your health and helps with weight control, especially weight loss maintenance.      When starting to incorporate an exercise routine into your day, it is helpful to set specific goals.  For example, I will walk at moderate intensity from 12:30-12:45pm on Monday, Wednesday,  and Friday.  Schedule your exercise time into your calendar.  Think of any potential barriers that may come up and prevent you from exercising.  Develop solutions or back-up plans for when these barriers may arise.    Walking is an ideal activity to start with if you do not currently have an exercise routine. You can make the activity more fun by doing it with a friend or listening to music or a book on tape while you do it. If you don't enjoy walking, think of other activities you like, such as bike riding or swimming.  Other alternatives include group fitness classes or exercise at home using DVDs, Apps, etc.    If you are new to exercising, then start with 10 minutes 3-4 days per week.  After two weeks, increase to 15 minutes 3-4 days per week.  If you already exercise more than this, continue at your higher level, or increase by 10-15 minutes if able.         Aerobic Activity  Aerobic Activity gets you breathing harder and your heart beating faster.  Eventually, you should work up to 150 - 300 minutes of moderate-intensity aerobic activity every week or 75 - 150 minutes of vigorous-intensity aerobic activity every week.  An easy way to gauge the intensity of your activity is with the Talk Test.  During moderate activity, you should be able to talk, but not sing without having to pause for a breath.  During vigorous activity, you shouldn't be able to say more than a few words without pausing for a breath.  You should not push yourself until you are completely out of breath, tired, or sore.    Examples of Aerobic Activity:  Walking  Running  Swimming  Biking  Playing sports like tennis or basketball  Dancing  Jumping Rope      Strength Training  It is also recommended that you perform muscle-strengthening activities at least 2 days per week.  Be sure to include activities that work all major muscle groups (legs, arms, abdomen, back, buttocks, chest, and shoulders)    Examples of Strength Training  Lifting  weights  Working with resistance band  Using your body weight for resistance (squats, push-ups, sit-ups)  Pilates  Yoga      https://health.gov/moveyourway/activity-planner/      Remember to keep a record of your activity to track your progress.

## 2024-10-14 NOTE — PROGRESS NOTES
Subjective     Patient ID: Saskia Huang is a 44 y.o. female.    Chief Complaint: Consult and Obesity    Patient presents for treatment of obesity.   Has noticed increase in joint pain and RAYMOND, feels like it is due to increased weight    Co-morbidities   HTN    Negative for thyroid cancer  Negative for kidney stones    No contraception    History of Weight Loss Efforts  No prior use of weight loss medications    Current Physical Activity  Works from home  No regular exercise routine    Current Eating Habits - eats 2 meals per day  Breakfast - cereal; breakfast sandwich; pancakes and rizzo or sausage  Lunch and Dinner - meat sauce and spaghetti; steak with gravy and rice; tacos; red beans and rice   Snacks - chips, kettle popcorn, yogurt, cheese  Beverages - soft drinks, lemonade, water    Medical Weight Loss  10/14/2024: 259.8 lbs, BMI 40.7, BFP 46.5%, .9 lbs, SMM 77.6 lbs, BMR 1731 kcal        Review of Systems   Constitutional:  Negative for chills and fever.   Respiratory:  Negative for shortness of breath.    Cardiovascular:  Negative for chest pain and palpitations.   Gastrointestinal:  Negative for abdominal pain, nausea and vomiting.   Musculoskeletal:  Positive for arthralgias.   Neurological:  Negative for dizziness and light-headedness.   Psychiatric/Behavioral:  The patient is not nervous/anxious.           Objective    Latest Reference Range & Units 04/15/24 12:48 06/17/24 08:32   WBC 3.90 - 12.70 K/uL 8.36    RBC 4.00 - 5.40 M/uL 4.49    Hemoglobin 12.0 - 16.0 g/dL 12.7    Hematocrit 37.0 - 48.5 % 38.5    MCV 82 - 98 fL 86    MCH 27.0 - 31.0 pg 28.3    MCHC 32.0 - 36.0 g/dL 33.0    RDW 11.5 - 14.5 % 13.2    Platelet Count 150 - 450 K/uL 266    MPV 9.2 - 12.9 fL 11.5    Gran % 38.0 - 73.0 % 57.4    Lymph % 18.0 - 48.0 % 32.7    Mono % 4.0 - 15.0 % 8.3    Eos % 0.0 - 8.0 % 0.7    Basophil % 0.0 - 1.9 % 0.5    Immature Granulocytes 0.0 - 0.5 % 0.4    Gran # (ANC) 1.8 - 7.7 K/uL 4.8    Lymph #  1.0 - 4.8 K/uL 2.7    Mono # 0.3 - 1.0 K/uL 0.7    Eos # 0.0 - 0.5 K/uL 0.1    Baso # 0.00 - 0.20 K/uL 0.04    Immature Grans (Abs) 0.00 - 0.04 K/uL 0.03    nRBC 0 /100 WBC 0    Differential Method  Automated    Sodium 136 - 145 mmol/L 138    Potassium 3.5 - 5.1 mmol/L 4.1    Chloride 95 - 110 mmol/L 104    CO2 23 - 29 mmol/L 26    Anion Gap 8 - 16 mmol/L 8    BUN 6 - 20 mg/dL 13    Creatinine 0.5 - 1.4 mg/dL 0.9    eGFR >60 mL/min/1.73 m^2 >60.0    Glucose 70 - 110 mg/dL 85    Calcium 8.7 - 10.5 mg/dL 9.5    ALP 55 - 135 U/L 91 77   PROTEIN TOTAL 6.0 - 8.4 g/dL 7.8 7.0   Albumin 3.5 - 5.2 g/dL 3.5 3.3 (L)   BILIRUBIN TOTAL 0.1 - 1.0 mg/dL 1.6 (H) 1.2 (H)   Bilirubin Direct 0.1 - 0.3 mg/dL  0.4 (H)   AST 10 - 40 U/L 17 15   ALT 10 - 44 U/L 12 12   Cholesterol Total 120 - 199 mg/dL 174    HDL 40 - 75 mg/dL 47    HDL/Cholesterol Ratio 20.0 - 50.0 % 27.0    Non-HDL Cholesterol mg/dL 127    Total Cholesterol/HDL Ratio 2.0 - 5.0  3.7    Triglycerides 30 - 150 mg/dL 59    LDL Cholesterol 63.0 - 159.0 mg/dL 115.2    Vitamin D 30 - 96 ng/mL 18 (L)    Hemoglobin A1C External 4.0 - 5.6 % 5.2    Estimated Avg Glucose 68 - 131 mg/dL 103    TSH 0.400 - 4.000 uIU/mL 1.466    (L): Data is abnormally low  (H): Data is abnormally high    Vitals:    10/14/24 1614   BP: 133/80   Pulse: 74       Physical Exam  Vitals reviewed.   Constitutional:       General: She is not in acute distress.     Appearance: Normal appearance. She is obese. She is not ill-appearing, toxic-appearing or diaphoretic.   HENT:      Head: Normocephalic and atraumatic.   Cardiovascular:      Rate and Rhythm: Normal rate.   Pulmonary:      Effort: Pulmonary effort is normal. No respiratory distress.   Skin:     General: Skin is warm and dry.   Neurological:      Mental Status: She is alert and oriented to person, place, and time.            Assessment and Plan     1. Class 3 severe obesity due to excess calories with serious comorbidity and body mass index (BMI)  of 40.0 to 44.9 in adult  -     semaglutide, weight loss, (WEGOVY) 0.25 mg/0.5 mL PnIj; Inject 0.25 mg into the skin every 7 days. for 4 doses  Dispense: 2 mL; Refill: 0  -     semaglutide, weight loss, (WEGOVY) 0.5 mg/0.5 mL PnIj; Inject 0.5 mg into the skin every 7 days. for 4 doses  Dispense: 2 mL; Refill: 0  -     semaglutide, weight loss, (WEGOVY) 1 mg/0.5 mL PnIj; Inject 1 mg into the skin every 7 days. for 4 doses  Dispense: 2 mL; Refill: 0    2. Benign essential hypertension  -     semaglutide, weight loss, (WEGOVY) 0.25 mg/0.5 mL PnIj; Inject 0.25 mg into the skin every 7 days. for 4 doses  Dispense: 2 mL; Refill: 0  -     semaglutide, weight loss, (WEGOVY) 0.5 mg/0.5 mL PnIj; Inject 0.5 mg into the skin every 7 days. for 4 doses  Dispense: 2 mL; Refill: 0  -     semaglutide, weight loss, (WEGOVY) 1 mg/0.5 mL PnIj; Inject 1 mg into the skin every 7 days. for 4 doses  Dispense: 2 mL; Refill: 0    3. Encounter for weight loss counseling  -     semaglutide, weight loss, (WEGOVY) 0.25 mg/0.5 mL PnIj; Inject 0.25 mg into the skin every 7 days. for 4 doses  Dispense: 2 mL; Refill: 0  -     semaglutide, weight loss, (WEGOVY) 0.5 mg/0.5 mL PnIj; Inject 0.5 mg into the skin every 7 days. for 4 doses  Dispense: 2 mL; Refill: 0  -     semaglutide, weight loss, (WEGOVY) 1 mg/0.5 mL PnIj; Inject 1 mg into the skin every 7 days. for 4 doses  Dispense: 2 mL; Refill: 0      - Wegovy  weekly injection    - Log all food and beverage intake with a daily calorie goal of 1500 calories per day    - Moderate intensity aerobic exercise for 150 minutes per week plus resistance/strength exercises 2-3x/week

## 2024-11-01 ENCOUNTER — TELEPHONE (OUTPATIENT)
Dept: PODIATRY | Facility: CLINIC | Age: 44
End: 2024-11-01
Payer: COMMERCIAL

## 2024-11-01 DIAGNOSIS — B35.1 ONYCHOMYCOSIS DUE TO DERMATOPHYTE: Primary | ICD-10-CM

## 2024-11-01 RX ORDER — TERBINAFINE HYDROCHLORIDE 250 MG/1
250 TABLET ORAL DAILY
Qty: 30 TABLET | Refills: 0 | Status: SHIPPED | OUTPATIENT
Start: 2024-11-01 | End: 2024-12-01

## 2024-12-24 ENCOUNTER — HOSPITAL ENCOUNTER (EMERGENCY)
Facility: HOSPITAL | Age: 44
Discharge: HOME | End: 2024-12-24
Attending: STUDENT IN AN ORGANIZED HEALTH CARE EDUCATION/TRAINING PROGRAM
Payer: COMMERCIAL

## 2024-12-24 VITALS
SYSTOLIC BLOOD PRESSURE: 124 MMHG | DIASTOLIC BLOOD PRESSURE: 83 MMHG | HEART RATE: 77 BPM | OXYGEN SATURATION: 99 % | TEMPERATURE: 98 F | BODY MASS INDEX: 40.81 KG/M2 | RESPIRATION RATE: 18 BRPM | HEIGHT: 67 IN | WEIGHT: 260 LBS

## 2024-12-24 DIAGNOSIS — R51.9 ACUTE NONINTRACTABLE HEADACHE, UNSPECIFIED HEADACHE TYPE: Primary | ICD-10-CM

## 2024-12-24 LAB
B-HCG UR QL: NEGATIVE
CTP QC/QA: YES
INFLUENZA A, MOLECULAR: NEGATIVE
INFLUENZA B, MOLECULAR: NEGATIVE
SARS-COV-2 RDRP RESP QL NAA+PROBE: NEGATIVE
SPECIMEN SOURCE: NORMAL

## 2024-12-24 PROCEDURE — 25000003 PHARM REV CODE 250: Performed by: STUDENT IN AN ORGANIZED HEALTH CARE EDUCATION/TRAINING PROGRAM

## 2024-12-24 PROCEDURE — 63600175 PHARM REV CODE 636 W HCPCS: Performed by: STUDENT IN AN ORGANIZED HEALTH CARE EDUCATION/TRAINING PROGRAM

## 2024-12-24 PROCEDURE — 96374 THER/PROPH/DIAG INJ IV PUSH: CPT

## 2024-12-24 PROCEDURE — 87635 SARS-COV-2 COVID-19 AMP PRB: CPT | Performed by: STUDENT IN AN ORGANIZED HEALTH CARE EDUCATION/TRAINING PROGRAM

## 2024-12-24 PROCEDURE — 81025 URINE PREGNANCY TEST: CPT | Performed by: STUDENT IN AN ORGANIZED HEALTH CARE EDUCATION/TRAINING PROGRAM

## 2024-12-24 PROCEDURE — 87502 INFLUENZA DNA AMP PROBE: CPT | Performed by: STUDENT IN AN ORGANIZED HEALTH CARE EDUCATION/TRAINING PROGRAM

## 2024-12-24 PROCEDURE — 99285 EMERGENCY DEPT VISIT HI MDM: CPT | Mod: 25

## 2024-12-24 RX ORDER — METOCLOPRAMIDE HYDROCHLORIDE 5 MG/ML
10 INJECTION INTRAMUSCULAR; INTRAVENOUS
Status: COMPLETED | OUTPATIENT
Start: 2024-12-24 | End: 2024-12-24

## 2024-12-24 RX ORDER — ACETAMINOPHEN 500 MG
1000 TABLET ORAL
Status: COMPLETED | OUTPATIENT
Start: 2024-12-24 | End: 2024-12-24

## 2024-12-24 RX ADMIN — ACETAMINOPHEN 1000 MG: 500 TABLET ORAL at 06:12

## 2024-12-24 RX ADMIN — METOCLOPRAMIDE 10 MG: 5 INJECTION, SOLUTION INTRAMUSCULAR; INTRAVENOUS at 07:12

## 2024-12-25 NOTE — ED TRIAGE NOTES
"Saskia Huang, a 44 y.o. female presents to the ED w/ complaint of headache x 2 days with photophobia, unrelieved by tylenol this morning; as well as thoracic back pain and a rash located under the right breast. Pt denies any vision changes and reports the headache as "worst ever". Pt has no other complaints at this time.    Triage note:  Chief Complaint   Patient presents with    Headache     HA x2 days. Took 2 tylenol for HA pain without relief.     Back Pain     Since Thursday. Denies injury/denies incontinence of bladder or bowel    Rash     Rash under right breast that she noticed last night     Review of patient's allergies indicates:  No Known Allergies  Past Medical History:   Diagnosis Date    Benign essential hypertension 03/15/2018    History of cervical cerclage     Hypertension           LOC: The patient is awake, alert and aware of environment with an appropriate affect, oriented x 4 and speaking appropriately.   APPEARANCE: Patient appears comfortable and in no acute distress, patient is clean and well groomed.  NEURO: Pt opens eyes spontaneously, behavior appropriate to situation, follows commands, facial expression symmetrical, bilateral hand grasp equal and even, purposeful motor response noted, normal sensation in all extremities when touched with a finger, PERRLA noted. GCS 15. Pt reports "worst ever" 10/10 headache with photophobia.  HEENT: Head is normocephalic and sits midline on the shoulders, eyes are symmetrical with no blurry vision or vision changes; ears are clean, dry, intact, patent with no cerumen blocking the pathway, no hearing aids present; nose is free from clean, dry, intact, without any drainage, both nostrils are patent. Pt reports photophobia.  SKIN: The skin is warm and dry, patient has normal skin turgor and moist mucus membranes, rash located under the right breast.  MUSCULOSKELETAL: Patient moving all extremities spontaneously with AROM in all extremities, no swelling " noted. Pt reports thoracic back pain.  RESPIRATORY: Airway is open and patent, respirations are spontaneous, patient has a normal effort and rate, no accessory muscle use noted, breath sounds are clear and equal bilaterally. The pt denies SOB at this time.  CARDIAC: No edema noted, capillary refill < 3 seconds. The pt denies chest pain at this time.  GASTRO: Soft and non tender to palpation, no distention noted, normoactive bowel sounds present in all four quadrants. The pt denies any nausea or vomiting at this time.  : Pt denies any pain or frequency with urination.  VASCULAR: All pulses 2+, all extremities warm, no numbness or tingling to any extremities, no edema noted.   <<----- Click to add NO pertinent Past Medical History No pertinent past medical history

## 2024-12-25 NOTE — DISCHARGE INSTRUCTIONS
It was a pleasure taking care of you today!     Home Care:   - Tylenol 1000mg every 8 hours and Ibuprofen 400mg every 4-6 hours as needed for pain/fever/body aches  - You may apply Cortisone cream to your rash as directed on the packaging    Follow-Up Plan:  - Follow-up with primary care doctor within 3 - 5 days to discuss your recent ER visit and any additional concerns that you may have.  - Additional testing and/or evaluation as directed by your primary doctor    Return to the Emergency Department for symptoms including but not limited to: persistence or worsening of symptoms, shortness of breath or chest pain, inability to drink without vomiting, passing out/fainting/ loss of consciousness, or if you have other concerns.

## 2024-12-25 NOTE — ED PROVIDER NOTES
Encounter Date: 12/24/2024       History     Chief Complaint   Patient presents with    Headache     HA x2 days. Took 2 tylenol for HA pain without relief.     Back Pain     Since Thursday. Denies injury/denies incontinence of bladder or bowel    Rash     Rash under right breast that she noticed last night     44-year-old female presents with headache and body aches.  Patient reports a 2 day history of headaches that are worse with light.  She took 1 dose of Tylenol without relief.  Also reports generalized body aches and mid back pain.  Denies changes in vision, dizziness, chest pain, shortness of breath, abdominal pain, nausea, vomiting, dysuria, hematuria, diarrhea, constipation, black/bloody stools.  She was around some case yesterday that had similar symptoms.  She states that headache is currently a 10/10 and was about a 5/10 when it initially started.  It was not acute onset on nature.  It has been largely constant for the past 2 days.  She is worried about an aneurysm.  States it is the worst headache of her life.  Also noticed a rash under her right breast and on her back. No neck pain or stiffness.     The history is provided by the patient.     Review of patient's allergies indicates:  No Known Allergies  Past Medical History:   Diagnosis Date    Benign essential hypertension 03/15/2018    History of cervical cerclage     Hypertension      History reviewed. No pertinent surgical history.  Family History   Problem Relation Name Age of Onset    Hypertension Mother      No Known Problems Father      Diabetes Paternal Grandmother      Depression Neg Hx      Hearing loss Neg Hx      Melanoma Neg Hx       Social History     Tobacco Use    Smoking status: Never    Smokeless tobacco: Never   Substance Use Topics    Alcohol use: Yes     Comment: occasional, on holidays    Drug use: No     Review of Systems    Physical Exam     Initial Vitals [12/24/24 1827]   BP Pulse Resp Temp SpO2   136/79 84 20 (!) 101.5 °F  (38.6 °C) 96 %      MAP       --         Physical Exam    Nursing note and vitals reviewed.  Constitutional: She appears well-developed and well-nourished. She is not diaphoretic. No distress.   HENT:   Head: Normocephalic and atraumatic.   Eyes: Conjunctivae and EOM are normal. Pupils are equal, round, and reactive to light.   Neck: Neck supple.   No neck stiffness   Normal range of motion.  Cardiovascular:  Normal rate, regular rhythm, normal heart sounds and intact distal pulses.           No murmur heard.  Pulmonary/Chest: Breath sounds normal. No respiratory distress. She has no wheezes. She has no rhonchi. She has no rales.   Abdominal: Abdomen is soft. She exhibits no distension. There is no rebound and no guarding.   Musculoskeletal:         General: No tenderness or edema.      Cervical back: Normal range of motion and neck supple.     Neurological: She is alert and oriented to person, place, and time. GCS score is 15. GCS eye subscore is 4. GCS verbal subscore is 5. GCS motor subscore is 6.   Skin: Skin is warm and dry. Capillary refill takes less than 2 seconds.   Under right breast near midline there's a small collection of bumps, no vesicles, no warmth or significant redness. No swelling. Blanchable.         ED Course   Procedures  Labs Reviewed   INFLUENZA A & B BY MOLECULAR       Result Value    Influenza A, Molecular Negative      Influenza B, Molecular Negative      Flu A & B Source Nasal swab     SARS-COV-2 RNA AMPLIFICATION, QUAL    SARS-CoV-2 RNA, Amplification, Qual Negative     POCT URINE PREGNANCY    POC Preg Test, Ur Negative       Acceptable Yes            Imaging Results              CT Head Without Contrast (Final result)  Result time 12/24/24 21:11:29      Final result by Santosh Brush MD (12/24/24 21:11:29)                   Impression:      No evidence of acute intracranial pathology.    If there remains strong clinical suspicion for acute intracranial abnormality,  consider MRI or short-term follow-up repeat CT.    Electronically signed by resident: Ezekiel Charlton  Date:    12/24/2024  Time:    20:36    Electronically signed by: Santosh Brush  Date:    12/24/2024  Time:    21:11               Narrative:    EXAMINATION:  CT HEAD WITHOUT CONTRAST    CLINICAL HISTORY:  Headache, sudden, severe;    TECHNIQUE:  Low dose axial CT images obtained throughout the head without the use of intravenous contrast.  Axial, sagittal and coronal reconstructions were performed.    COMPARISON:  None.    FINDINGS:  Intracranial compartment:    Ventricles and sulci are normal in size for age without evidence of hydrocephalus.    The brain parenchyma appears within normal limits.  No parenchymal  hemorrhage, edema, mass effect or major vascular distribution infarct.    No extra-axial blood or fluid collections.    Skull/extracranial contents (limited evaluation):    No displaced calvarial fracture.    The mastoid air cells and visualized paranasal sinuses are essentially clear.                                       Medications   acetaminophen tablet 1,000 mg (1,000 mg Oral Given 12/24/24 1850)   metoclopramide injection 10 mg (10 mg Intravenous Given 12/24/24 1936)     Medical Decision Making  Differential diagnoses considered includes flu, COVID, viral syndrome, ICH, SAH, migraine    See ED course for additional attending MDM.     Amount and/or Complexity of Data Reviewed  Labs: ordered. Decision-making details documented in ED Course.  Radiology: ordered and independent interpretation performed. Decision-making details documented in ED Course.    Risk  OTC drugs.  Prescription drug management.               ED Course as of 12/24/24 2252   Tue Dec 24, 2024   1945 SARS-CoV-2 RNA, Amplification, Qual: Negative [BD]   1947 hCG Qualitative, Urine: Negative [BD]   1947 Influenza A, Molecular: Negative [BD]   2013 CT Head Without Contrast  CT Head reviewed and independently interpreted by me is  unremarkable without evidence of large-vessel infarct or intracranial hemorrhage   [BD]   2130 Patient feeling much better at this time.  Offered Toradol but she said it was not that bad.  Suspect she has a viral syndrome. OTC tylenol and ibuprofen use encouraged. Patient will be discharged at this time. Patient has been given home care instructions, follow up instructions, and strict return precautions. They agree with and are comfortable with the plan.  [BD]      ED Course User Index  [BD] Rickey Coon MD                             Clinical Impression:  Final diagnoses:  [R51.9] Acute nonintractable headache, unspecified headache type (Primary)          ED Disposition Condition    Discharge Stable          ED Prescriptions    None       Follow-up Information       Follow up With Specialties Details Why Contact Info    Pebbles Bernal MD Internal Medicine Schedule an appointment as soon as possible for a visit  To discuss your recent ER visit and any additional concerns that you may have [address]  [facility] [address]  [phone]      [facility] - Emergency Dept Emergency Medicine Go to  As needed, If symptoms worsen [id number]  [facility] [id number] [phone]                Rickey Coon MD  12/24/24 2093

## 2024-12-30 ENCOUNTER — TELEPHONE (OUTPATIENT)
Dept: INTERNAL MEDICINE | Facility: CLINIC | Age: 44
End: 2024-12-30
Payer: COMMERCIAL

## 2024-12-30 NOTE — TELEPHONE ENCOUNTER
----- Message from Ariel Evans sent at 12/26/2024  1:56 PM CST -----  Contact: [phone] Patient  Please review and advise  ----- Message -----  From: Mary Self  Sent: 12/26/2024   1:08 PM CST  To: Dolores Rogel Staff    Caller is requesting an earlier appointment then we can schedule.  Caller is requesting a message be sent to the provider.    If this is for urgent care symptoms, did you offer other providers at this location, providers at other locations, or [facility]? (yes, no, n/a):  yes, pt declined to schedule with Maddy Diaz on 1/23/2025    If this is for the patients physical, did you offer to schedule next available and put on wait list, or to see NP or PA for their physical?  (yes, no, n/a):  no    When is the next available appointment with their provider:  05/06/2025    Reason for the appointment:  ER follow up for headaches and also to look at a rash    Patient preference of timeframe to be scheduled:  next week or 2    Would the patient like a call back, or a response through their [facility] portal?:  call back     Comments:

## 2025-01-09 NOTE — PROGRESS NOTES
INTERNAL MEDICINE ESTABLISHED PATIENT VISIT NOTE    Subjective:     Chief Complaint: Rash and Annual Exam       Patient ID: Saskia Huang is a 44 y.o. female with HTN, hx migraines, chronic back pain (had ED visit in Nov for sciatic sx), Vit D def, last seen by me 4/2024, here today for annual exam.    Seen by Dr. Tinoco in Derm in Oct for acne who rx'ed tretinoin.  Seen by Dr. Avila in Oct for L knee pain and neck pain.  Was referred for PT and rec wt loss but states they never called her but neck no longer bothersome.  Knee still pops but no longer painful so wants to hold off on PT for now.  Was seen by Dr. Truong in bariatric med clinic who rx'ed [employer] which req a PA but states [employer] told her it was never completed.    Had ED visit in Dec for fever, back pain, rash, and h/a.  CT head at that time wnl.  Flu and COVID neg.  Suspected viral syndrome and discharged home c conservative mgmt.  Had UC visit 3 days later and dx'ed c Shingles.  Was tx'ed c Valtrex and Gabapentin.  States rash is mostly resolved but still has some itching and occasional pain and req refill on Gabapentin.  Completed course of Valtrex.      Past Medical History:  Past Medical History:   Diagnosis Date    Benign essential hypertension 03/15/2018    History of cervical cerclage     Hypertension        Home Medications:  Prior to Admission medications    Medication Sig Start Date End Date Taking? Authorizing Provider   amLODIPine (NORVASC) 5 MG tablet Take 1 tablet by mouth once daily 4/15/24   Pebbles Bernal MD   ciclopirox (PENLAC) 8 % Soln Apply topically nightly. Remove once a week with nail polish remover 10/11/24   Rosa Renteria DPM   ergocalciferol (ERGOCALCIFEROL) 50,000 unit Cap Take 1 capsule (50,000 Units total) by mouth twice a week. 4/18/24   Pebbles Bernal MD   hydroCHLOROthiazide (HYDRODIURIL) 25 MG tablet Take 1 tablet by mouth once daily 4/15/24   Pebbles Bernal MD   ketoconazole (NIZORAL) 2 % cream Apply topically 2 (two)  times daily. Prn nose rash 10/8/24   Sourav Tinoco MD   meloxicam (MOBIC) 7.5 MG tablet Take 1 tablet (7.5 mg total) by mouth daily as needed for Pain. 4/15/24   Pebbles Bernal MD   naproxen (NAPROSYN) 500 MG tablet Take 1 tablet (500 mg total) by mouth 2 (two) times daily as needed. Take with food 11/26/23   Ame Padilla PA-C   tretinoin (RETIN-A) 0.05 % cream Apply topically every evening. Start with every other night and move up to nightly after 2 weeks if not too dry. 10/8/24   Sourav Tinoco MD   triamcinolone (KENALOG) 0.5 % ointment Apply topically 2 (two) times daily. Use on affected dry skin around your ears 11/9/23   Pebbles Bernal MD       Allergies:  Review of patient's allergies indicates:  No Known Allergies    Social History:  Social History     Tobacco Use    Smoking status: Never    Smokeless tobacco: Never   Substance Use Topics    Alcohol use: Yes     Comment: occasional, on holidays    Drug use: No        Review of Systems   Constitutional:  Negative for appetite change, chills, fatigue, fever and unexpected weight change.   HENT:  Negative for congestion, hearing loss and rhinorrhea.    Eyes:  Negative for pain and visual disturbance.   Respiratory:  Negative for cough, chest tightness, shortness of breath and wheezing.    Cardiovascular:  Negative for chest pain, palpitations and leg swelling.   Gastrointestinal:  Negative for abdominal distention and abdominal pain.   Endocrine: Negative for polydipsia and polyuria.   Genitourinary:  Negative for decreased urine volume, difficulty urinating, dysuria, hematuria and vaginal discharge.   Neurological:  Negative for weakness, numbness and headaches.   Psychiatric/Behavioral:  Negative for behavioral problems and confusion.          Health Maintenance:     Immunizations:   Influenza rec today, pt declined.  Risks and benefits were discussed with patient.  TDap 1/2020  COVID 8/2021, 9/2021, rec schedule Omicron booster.  Pt declined.     Cancer  "Screening:  PAP:  2/2024 PAMVANESA Petersen Lopez ASCUS, shift in petra suggestive of BV, HPV neg.  Mammogram: 2/2024 benign, will schedule repeat for next month.  Colonoscopy: rec at 47    Objective:   /78   Pulse 93   Ht 5' 7" (1.702 m)   Wt 115.9 kg (255 lb 8.2 oz)   LMP 12/18/2024   SpO2 97%   BMI 40.02 kg/m²        General: AAO x3, no apparent distress  HEENT: no cervical LAD, no thyroid masses appreciated  CV: RRR, no m/r/g  Pulm: Lungs CTAB, no crackles, no wheezes  Abd: s/NT/ND +BS  Extremities: no c/c/e  Skin: small area of hyperpigmentation along dermatome on R flank, flat, dry    Labs:     Lab Results   Component Value Date    WBC 8.36 04/15/2024    HGB 12.7 04/15/2024    HCT 38.5 04/15/2024    MCV 86 04/15/2024     04/15/2024     Sodium   Date Value Ref Range Status   04/15/2024 138 136 - 145 mmol/L Final     Potassium   Date Value Ref Range Status   04/15/2024 4.1 3.5 - 5.1 mmol/L Final     Chloride   Date Value Ref Range Status   04/15/2024 104 95 - 110 mmol/L Final     CO2   Date Value Ref Range Status   04/15/2024 26 23 - 29 mmol/L Final     Glucose   Date Value Ref Range Status   04/15/2024 85 70 - 110 mg/dL Final     BUN   Date Value Ref Range Status   04/15/2024 13 6 - 20 mg/dL Final     Creatinine   Date Value Ref Range Status   04/15/2024 0.9 0.5 - 1.4 mg/dL Final     Calcium   Date Value Ref Range Status   04/15/2024 9.5 8.7 - 10.5 mg/dL Final     Total Protein   Date Value Ref Range Status   06/17/2024 7.0 6.0 - 8.4 g/dL Final     Albumin   Date Value Ref Range Status   06/17/2024 3.3 (L) 3.5 - 5.2 g/dL Final     Total Bilirubin   Date Value Ref Range Status   06/17/2024 1.2 (H) 0.1 - 1.0 mg/dL Final     Comment:     For infants and newborns, interpretation of results should be based  on gestational age, weight and in agreement with clinical  observations.    Premature Infant recommended reference ranges:  Up to 24 hours.............<8.0 mg/dL  Up to 48 hours............<12.0 " mg/dL  3-5 days..................<15.0 mg/dL  6-29 days.................<15.0 mg/dL       Alkaline Phosphatase   Date Value Ref Range Status   06/17/2024 77 55 - 135 U/L Final     AST   Date Value Ref Range Status   06/17/2024 15 10 - 40 U/L Final     ALT   Date Value Ref Range Status   06/17/2024 12 10 - 44 U/L Final     Anion Gap   Date Value Ref Range Status   04/15/2024 8 8 - 16 mmol/L Final     eGFR if    Date Value Ref Range Status   03/22/2022 >60.0 >60 mL/min/1.73 m^2 Final     eGFR if non    Date Value Ref Range Status   03/22/2022 >60.0 >60 mL/min/1.73 m^2 Final     Comment:     Calculation used to obtain the estimated glomerular filtration  rate (eGFR) is the CKD-EPI equation.        Lab Results   Component Value Date    HGBA1C 5.2 04/15/2024     Lab Results   Component Value Date    LDLCALC 115.2 04/15/2024     Lab Results   Component Value Date    TSH 1.466 04/15/2024         Assessment/Plan     Saskia was seen today for rash and annual exam.    Diagnoses and all orders for this visit:    Visit for annual health examination  History and physical exam completed.  Health maintenance reviewed as above.  -     CBC Auto Differential; Future  -     Comprehensive Metabolic Panel; Future  -     Hemoglobin A1C; Future  -     Lipid Panel; Future  -     TSH; Future  -     Vitamin D; Future    Benign essential hypertension  At goal  Cont meds  -     amLODIPine (NORVASC) 5 MG tablet; Take 1 tablet by mouth once daily  -     hydroCHLOROthiazide (HYDRODIURIL) 25 MG tablet; Take 1 tablet by mouth once daily    Vitamin D deficiency  Needs updated labs  Cont supplement  -     Vitamin D; Future  -     ergocalciferol (ERGOCALCIFEROL) 50,000 unit Cap; Take 1 capsule (50,000 Units total) by mouth twice a week.    Post herpetic neuralgia  As per HPI  Improving overall  Okay for refill on Gabapentin  -     gabapentin (NEURONTIN) 100 MG capsule; Take 1 capsule (100 mg total) by mouth 3  (three) times daily.    Class 3 severe obesity due to excess calories with serious comorbidity and body mass index (BMI) of 40.0 to 44.9 in adult  Down 5 lbs since last month  Advised she can request PA through bariatric med  If not approved could consider compounded Semaglutide via [facility]  Counseled extensively on need for diet changes and daily exercise.  Discussed examples of healthy eating.  Recommend at least 30 minutes of exercise at least 5 days a week.      Screening mammogram, encounter for  -     Mammo Digital Screening Bilat w/ Adam; Future    Well woman exam  -     Ambulatory referral/consult to Obstetrics / Gynecology; Future    HM as above  RTC in 6 mos, sooner if needed.  Fasting labs today.    Pebbles Bernal MD  Department of Internal Medicine - [facility]  01/13/2025

## 2025-01-13 ENCOUNTER — LAB VISIT (OUTPATIENT)
Dept: LAB | Facility: HOSPITAL | Age: 45
End: 2025-01-13
Attending: INTERNAL MEDICINE
Payer: COMMERCIAL

## 2025-01-13 ENCOUNTER — OFFICE VISIT (OUTPATIENT)
Dept: INTERNAL MEDICINE | Facility: CLINIC | Age: 45
End: 2025-01-13
Payer: COMMERCIAL

## 2025-01-13 VITALS
DIASTOLIC BLOOD PRESSURE: 78 MMHG | BODY MASS INDEX: 40.1 KG/M2 | HEART RATE: 93 BPM | OXYGEN SATURATION: 97 % | WEIGHT: 255.5 LBS | HEIGHT: 67 IN | SYSTOLIC BLOOD PRESSURE: 120 MMHG

## 2025-01-13 DIAGNOSIS — Z00.00 VISIT FOR ANNUAL HEALTH EXAMINATION: ICD-10-CM

## 2025-01-13 DIAGNOSIS — Z00.00 VISIT FOR ANNUAL HEALTH EXAMINATION: Primary | ICD-10-CM

## 2025-01-13 DIAGNOSIS — B02.29 POST HERPETIC NEURALGIA: ICD-10-CM

## 2025-01-13 DIAGNOSIS — E66.01 CLASS 3 SEVERE OBESITY DUE TO EXCESS CALORIES WITH SERIOUS COMORBIDITY AND BODY MASS INDEX (BMI) OF 40.0 TO 44.9 IN ADULT: ICD-10-CM

## 2025-01-13 DIAGNOSIS — E66.813 CLASS 3 SEVERE OBESITY DUE TO EXCESS CALORIES WITH SERIOUS COMORBIDITY AND BODY MASS INDEX (BMI) OF 40.0 TO 44.9 IN ADULT: ICD-10-CM

## 2025-01-13 DIAGNOSIS — I10 BENIGN ESSENTIAL HYPERTENSION: ICD-10-CM

## 2025-01-13 DIAGNOSIS — Z12.31 SCREENING MAMMOGRAM, ENCOUNTER FOR: ICD-10-CM

## 2025-01-13 DIAGNOSIS — E55.9 VITAMIN D DEFICIENCY: ICD-10-CM

## 2025-01-13 DIAGNOSIS — Z01.419 WELL WOMAN EXAM: ICD-10-CM

## 2025-01-13 LAB
25(OH)D3+25(OH)D2 SERPL-MCNC: 16 NG/ML (ref 30–96)
ALBUMIN SERPL BCP-MCNC: 3.8 G/DL (ref 3.5–5.2)
ALP SERPL-CCNC: 75 U/L (ref 40–150)
ALT SERPL W/O P-5'-P-CCNC: 14 U/L (ref 10–44)
ANION GAP SERPL CALC-SCNC: 11 MMOL/L (ref 8–16)
AST SERPL-CCNC: 21 U/L (ref 10–40)
BASOPHILS # BLD AUTO: 0.04 K/UL (ref 0–0.2)
BASOPHILS NFR BLD: 0.6 % (ref 0–1.9)
BILIRUB SERPL-MCNC: 1.7 MG/DL (ref 0.1–1)
BUN SERPL-MCNC: 11 MG/DL (ref 6–20)
CALCIUM SERPL-MCNC: 9.3 MG/DL (ref 8.7–10.5)
CHLORIDE SERPL-SCNC: 105 MMOL/L (ref 95–110)
CHOLEST SERPL-MCNC: 169 MG/DL (ref 120–199)
CHOLEST/HDLC SERPL: 3.4 {RATIO} (ref 2–5)
CO2 SERPL-SCNC: 22 MMOL/L (ref 23–29)
CREAT SERPL-MCNC: 0.9 MG/DL (ref 0.5–1.4)
DIFFERENTIAL METHOD BLD: NORMAL
EOSINOPHIL # BLD AUTO: 0.1 K/UL (ref 0–0.5)
EOSINOPHIL NFR BLD: 0.8 % (ref 0–8)
ERYTHROCYTE [DISTWIDTH] IN BLOOD BY AUTOMATED COUNT: 13.8 % (ref 11.5–14.5)
EST. GFR  (NO RACE VARIABLE): >60 ML/MIN/1.73 M^2
ESTIMATED AVG GLUCOSE: 97 MG/DL (ref 68–131)
GLUCOSE SERPL-MCNC: 86 MG/DL (ref 70–110)
HBA1C MFR BLD: 5 % (ref 4–5.6)
HCT VFR BLD AUTO: 38.8 % (ref 37–48.5)
HDLC SERPL-MCNC: 50 MG/DL (ref 40–75)
HDLC SERPL: 29.6 % (ref 20–50)
HGB BLD-MCNC: 13.1 G/DL (ref 12–16)
IMM GRANULOCYTES # BLD AUTO: 0.02 K/UL (ref 0–0.04)
IMM GRANULOCYTES NFR BLD AUTO: 0.3 % (ref 0–0.5)
LDLC SERPL CALC-MCNC: 106.2 MG/DL (ref 63–159)
LYMPHOCYTES # BLD AUTO: 2.8 K/UL (ref 1–4.8)
LYMPHOCYTES NFR BLD: 39.1 % (ref 18–48)
MCH RBC QN AUTO: 28.8 PG (ref 27–31)
MCHC RBC AUTO-ENTMCNC: 33.8 G/DL (ref 32–36)
MCV RBC AUTO: 85 FL (ref 82–98)
MONOCYTES # BLD AUTO: 0.5 K/UL (ref 0.3–1)
MONOCYTES NFR BLD: 7.4 % (ref 4–15)
NEUTROPHILS # BLD AUTO: 3.7 K/UL (ref 1.8–7.7)
NEUTROPHILS NFR BLD: 51.8 % (ref 38–73)
NONHDLC SERPL-MCNC: 119 MG/DL
NRBC BLD-RTO: 0 /100 WBC
PLATELET # BLD AUTO: 251 K/UL (ref 150–450)
PMV BLD AUTO: 11.2 FL (ref 9.2–12.9)
POTASSIUM SERPL-SCNC: 3.9 MMOL/L (ref 3.5–5.1)
PROT SERPL-MCNC: 8.3 G/DL (ref 6–8.4)
RBC # BLD AUTO: 4.55 M/UL (ref 4–5.4)
SODIUM SERPL-SCNC: 138 MMOL/L (ref 136–145)
TRIGL SERPL-MCNC: 64 MG/DL (ref 30–150)
TSH SERPL DL<=0.005 MIU/L-ACNC: 2.34 UIU/ML (ref 0.4–4)
WBC # BLD AUTO: 7.06 K/UL (ref 3.9–12.7)

## 2025-01-13 PROCEDURE — 80053 COMPREHEN METABOLIC PANEL: CPT | Performed by: INTERNAL MEDICINE

## 2025-01-13 PROCEDURE — 3078F DIAST BP <80 MM HG: CPT | Mod: CPTII,S$GLB,, | Performed by: INTERNAL MEDICINE

## 2025-01-13 PROCEDURE — 3074F SYST BP LT 130 MM HG: CPT | Mod: CPTII,S$GLB,, | Performed by: INTERNAL MEDICINE

## 2025-01-13 PROCEDURE — 3008F BODY MASS INDEX DOCD: CPT | Mod: CPTII,S$GLB,, | Performed by: INTERNAL MEDICINE

## 2025-01-13 PROCEDURE — 82306 VITAMIN D 25 HYDROXY: CPT | Performed by: INTERNAL MEDICINE

## 2025-01-13 PROCEDURE — 1160F RVW MEDS BY RX/DR IN RCRD: CPT | Mod: CPTII,S$GLB,, | Performed by: INTERNAL MEDICINE

## 2025-01-13 PROCEDURE — 1159F MED LIST DOCD IN RCRD: CPT | Mod: CPTII,S$GLB,, | Performed by: INTERNAL MEDICINE

## 2025-01-13 PROCEDURE — 99999 PR PBB SHADOW E&M-EST. PATIENT-LVL IV: CPT | Mod: PBBFAC,,, | Performed by: INTERNAL MEDICINE

## 2025-01-13 PROCEDURE — 80061 LIPID PANEL: CPT | Performed by: INTERNAL MEDICINE

## 2025-01-13 PROCEDURE — 83036 HEMOGLOBIN GLYCOSYLATED A1C: CPT | Performed by: INTERNAL MEDICINE

## 2025-01-13 PROCEDURE — 99396 PREV VISIT EST AGE 40-64: CPT | Mod: S$GLB,,, | Performed by: INTERNAL MEDICINE

## 2025-01-13 PROCEDURE — 84443 ASSAY THYROID STIM HORMONE: CPT | Performed by: INTERNAL MEDICINE

## 2025-01-13 PROCEDURE — 85025 COMPLETE CBC W/AUTO DIFF WBC: CPT | Performed by: INTERNAL MEDICINE

## 2025-01-13 RX ORDER — AMLODIPINE BESYLATE 5 MG/1
TABLET ORAL
Qty: 90 TABLET | Refills: 3 | Status: SHIPPED | OUTPATIENT
Start: 2025-01-13

## 2025-01-13 RX ORDER — ERGOCALCIFEROL 1.25 MG/1
50000 CAPSULE ORAL
Qty: 24 CAPSULE | Refills: 3 | Status: SHIPPED | OUTPATIENT
Start: 2025-01-13

## 2025-01-13 RX ORDER — HYDROCHLOROTHIAZIDE 25 MG/1
TABLET ORAL
Qty: 90 TABLET | Refills: 3 | Status: SHIPPED | OUTPATIENT
Start: 2025-01-13

## 2025-01-13 RX ORDER — GABAPENTIN 100 MG/1
100 CAPSULE ORAL 3 TIMES DAILY
Qty: 90 CAPSULE | Refills: 1 | Status: SHIPPED | OUTPATIENT
Start: 2025-01-13 | End: 2026-01-13

## 2025-01-15 ENCOUNTER — TELEPHONE (OUTPATIENT)
Dept: PODIATRY | Facility: CLINIC | Age: 45
End: 2025-01-15
Payer: COMMERCIAL

## 2025-02-27 ENCOUNTER — HOSPITAL ENCOUNTER (OUTPATIENT)
Dept: RADIOLOGY | Facility: HOSPITAL | Age: 45
Discharge: HOME | End: 2025-02-27
Attending: INTERNAL MEDICINE
Payer: COMMERCIAL

## 2025-02-27 ENCOUNTER — RESULTS FOLLOW-UP (OUTPATIENT)
Dept: INTERNAL MEDICINE | Facility: CLINIC | Age: 45
End: 2025-02-27

## 2025-02-27 VITALS — BODY MASS INDEX: 40.02 KG/M2 | HEIGHT: 67 IN | WEIGHT: 255 LBS

## 2025-02-27 DIAGNOSIS — Z12.31 ENCOUNTER FOR SCREENING MAMMOGRAM FOR BREAST CANCER: ICD-10-CM

## 2025-02-27 PROCEDURE — 77063 BREAST TOMOSYNTHESIS BI: CPT | Mod: 26,,, | Performed by: RADIOLOGY

## 2025-02-27 PROCEDURE — 77063 BREAST TOMOSYNTHESIS BI: CPT | Mod: TC

## 2025-02-27 PROCEDURE — 77067 SCR MAMMO BI INCL CAD: CPT | Mod: 26,,, | Performed by: RADIOLOGY

## 2025-05-08 ENCOUNTER — OFFICE VISIT (OUTPATIENT)
Dept: OBSTETRICS AND GYNECOLOGY | Facility: CLINIC | Age: 45
End: 2025-05-08
Payer: COMMERCIAL

## 2025-05-08 ENCOUNTER — LAB VISIT (OUTPATIENT)
Dept: PRIMARY CARE CLINIC | Facility: CLINIC | Age: 45
End: 2025-05-08
Payer: COMMERCIAL

## 2025-05-08 VITALS
SYSTOLIC BLOOD PRESSURE: 121 MMHG | DIASTOLIC BLOOD PRESSURE: 83 MMHG | WEIGHT: 251.31 LBS | BODY MASS INDEX: 39.44 KG/M2 | HEIGHT: 67 IN

## 2025-05-08 DIAGNOSIS — Z12.31 VISIT FOR SCREENING MAMMOGRAM: ICD-10-CM

## 2025-05-08 DIAGNOSIS — E66.01 CLASS 3 SEVERE OBESITY DUE TO EXCESS CALORIES WITH SERIOUS COMORBIDITY AND BODY MASS INDEX (BMI) OF 40.0 TO 44.9 IN ADULT: ICD-10-CM

## 2025-05-08 DIAGNOSIS — E55.9 VITAMIN D DEFICIENCY: ICD-10-CM

## 2025-05-08 DIAGNOSIS — Z11.3 SCREEN FOR STD (SEXUALLY TRANSMITTED DISEASE): ICD-10-CM

## 2025-05-08 DIAGNOSIS — I10 BENIGN ESSENTIAL HYPERTENSION: ICD-10-CM

## 2025-05-08 DIAGNOSIS — E66.813 CLASS 3 SEVERE OBESITY DUE TO EXCESS CALORIES WITH SERIOUS COMORBIDITY AND BODY MASS INDEX (BMI) OF 40.0 TO 44.9 IN ADULT: ICD-10-CM

## 2025-05-08 DIAGNOSIS — Z01.419 ENCOUNTER FOR GYNECOLOGICAL EXAMINATION WITHOUT ABNORMAL FINDING: Primary | ICD-10-CM

## 2025-05-08 LAB
HAV IGM SERPL QL IA: NORMAL
HBV CORE IGM SERPL QL IA: NORMAL
HBV SURFACE AG SERPL QL IA: NORMAL
HCV AB SERPL QL IA: NORMAL
HIV 1+2 AB+HIV1 P24 AG SERPL QL IA: NORMAL
T PALLIDUM IGG+IGM SER QL: NORMAL

## 2025-05-08 PROCEDURE — 86593 SYPHILIS TEST NON-TREP QUANT: CPT

## 2025-05-08 PROCEDURE — 99999 PR PBB SHADOW E&M-EST. PATIENT-LVL III: CPT | Mod: PBBFAC,,, | Performed by: OBSTETRICS & GYNECOLOGY

## 2025-05-08 PROCEDURE — 87389 HIV-1 AG W/HIV-1&-2 AB AG IA: CPT

## 2025-05-08 PROCEDURE — 80074 ACUTE HEPATITIS PANEL: CPT

## 2025-05-09 ENCOUNTER — PATIENT MESSAGE (OUTPATIENT)
Dept: PODIATRY | Facility: CLINIC | Age: 45
End: 2025-05-09
Payer: COMMERCIAL

## 2025-05-09 NOTE — PROGRESS NOTES
HISTORY OF PRESENT ILLNESS:    Saskia Huang is a 44 y.o. female, , Patient's last menstrual period was 2025 (exact date).,  presents for a routine exam and has no complaints.  Patient reports cycles occur every 4 weeks lasting 5 days using 3-4 pads per day.   She denies any BTB.     History of abnormal pap: No  Last Pap:   Last MMG: N/A  Last Colonoscopy: N/A     She uses no birth control.   She does not desire STD screening.    The patient participates in regular exercise: no   The patient does not smoke.    The patient wears seatbelts.     Pt denies any domestic violence.    Past Medical History:   Diagnosis Date    Benign essential hypertension 03/15/2018    History of cervical cerclage     Hypertension        History reviewed. No pertinent surgical history.    MEDICATIONS AND ALLERGIES:    Current Medications[1]    Review of patient's allergies indicates:  No Known Allergies    Family History   Problem Relation Name Age of Onset    Hypertension Mother      No Known Problems Father      Diabetes Paternal Grandmother      Depression Neg Hx      Hearing loss Neg Hx      Melanoma Neg Hx         Social History[2]    COMPREHENSIVE GYN HISTORY:  PAP History: Denies abnormal Paps.  Infection History: Denies STDs. Denies PID.  Benign History: Denies uterine fibroids. Denies ovarian cysts. Denies endometriosis. Denies other conditions.  Cancer History: Denies cervical cancer. Denies uterine cancer or hyperplasia. Denies ovarian cancer. Denies vulvar cancer or pre-cancer. Denies vaginal cancer or pre-cancer. Denies breast cancer. Denies colon cancer.  Sexual Activity History: Reports currently being sexually active  Menstrual History: Monthly. Mod then light flow.   Dysmenorrhea History: Reports mild dysmenorrhea.       ROS:  GENERAL: No weight changes. No swelling. No fatigue. No fever.  CARDIOVASCULAR: No chest pain. No shortness of breath. No leg cramps.   NEUROLOGICAL: No headaches. No vision  "changes.  BREASTS: No pain. No lumps. No discharge.  ABDOMEN: No pain. No nausea. No vomiting. No diarrhea. No constipation.  REPRODUCTIVE: No abnormal bleeding.   VULVA: No pain. No lesions. No itching.  VAGINA: No relaxation. No itching. No odor. No discharge. No lesions.  URINARY: No incontinence. No nocturia. No frequency. No dysuria.    /83 (BP Location: Right arm, Patient Position: Sitting)   Ht 5' 7" (1.702 m)   Wt 114 kg (251 lb 5.2 oz)   LMP 04/16/2025 (Exact Date)   BMI 39.36 kg/m²     PE:  APPEARANCE: Well nourished, well developed, in no acute distress.  AFFECT: WNL, alert and oriented x 3.  SKIN: No acne or hirsutism.  NECK: Neck symmetric, without masses or thyromegaly.  NODES: No inguinal, cervical, axillary or femoral lymph node enlargement.  CHEST: Good respiratory effort.   ABDOMEN: Soft. No tenderness or masses. No hepatosplenomegaly. No hernias.  BREASTS: Symmetrical, no skin changes, visible lesions, palpable masses or nipple discharge bilaterally.  PELVIC: External female genitalia without lesions.  Female hair distribution. Adequate perineal body, Normal urethral meatus. Vagina moist and well rugated without lesions or discharge.  No significant cystocele or rectocele present. Cervix pink without lesions, discharge or tenderness. Uterus is 4-6 week size, regular, mobile and nontender. Adnexa without masses or tenderness.  EXTREMITIES: No edema    DIAGNOSIS:  1. Encounter for gynecological examination without abnormal finding    2. Visit for screening mammogram    3. Benign essential hypertension    4. Class 3 severe obesity due to excess calories with serious comorbidity and body mass index (BMI) of 40.0 to 44.9 in adult    5. Vitamin D deficiency    6. Screen for STD (sexually transmitted disease)        PLAN:    Orders Placed This Encounter    HIV 1/2 Ag/Ab (4th Gen)    Treponema Pallidium Antibodies IgG, IgM    Hepatitis Panel, Acute    C. trachomatis/N. gonorrhoeae by AMP DNA    " Vaginosis Screen by DNA Probe    HIV 1/2 Ag/Ab (4th Gen)    Treponema Pallidium Antibodies IgG, IgM    Hepatitis Panel, Acute    Liquid-Based Pap Smear, Screening       COUNSELING:  The patient was counseled today on:  -A.C.S. Pap and pelvic exam guidelines (pap every 3 years), recomendations for yearly mammogram;  -to follow up with her PCP for other health maintenance.    FOLLOW-UP with me annually.     As of April 1, 2021, the Cures Act has been passed nationally. This new law requires that all doctors progress notes, lab results, pathology reports and radiology reports be released IMMEDIATELY to the patient in the patient portal. That means that the results are released to you at the EXACT same time they are released to me. Therefore, with all of the patients that I have I am not able to reply to each patient exactly when the results come in. So there will be a delay from when you see the results to when I see them and have time to come up with a response to send you. Also I only see these results when I am on the computer at work. So if the results come in over the weekend or after 5 pm of a work day, I will not see them until the next business day. As you can tell, this is a challenge as a physician to give every patient the quick response they hope for and deserve. So please be patient!   Thanks for your understanding and patience.         [1]   Current Outpatient Medications:     amLODIPine (NORVASC) 5 MG tablet, Take 1 tablet by mouth once daily, Disp: 90 tablet, Rfl: 3    ciclopirox (PENLAC) 8 % Soln, Apply topically nightly. Remove once a week with nail polish remover, Disp: 6.6 mL, Rfl: 1    ergocalciferol (ERGOCALCIFEROL) 50,000 unit Cap, Take 1 capsule (50,000 Units total) by mouth twice a week., Disp: 24 capsule, Rfl: 3    gabapentin (NEURONTIN) 100 MG capsule, Take 1 capsule (100 mg total) by mouth 3 (three) times daily., Disp: 90 capsule, Rfl: 1    hydroCHLOROthiazide (HYDRODIURIL) 25 MG tablet,  Take 1 tablet by mouth once daily, Disp: 90 tablet, Rfl: 3  [2]   Social History  Socioeconomic History    Marital status: Single   Tobacco Use    Smoking status: Never     Passive exposure: Never    Smokeless tobacco: Never   Substance and Sexual Activity    Alcohol use: Yes     Comment: occasional, on holidays    Drug use: No    Sexual activity: Yes     Partners: Male     Birth control/protection: None   Social History Narrative    Works as outpatient      Social Drivers of Health     Financial Resource Strain: Medium Risk (5/29/2024)    Overall Financial Resource Strain (CARDIA)     Difficulty of Paying Living Expenses: Somewhat hard   Food Insecurity: Food Insecurity Present (5/29/2024)    Hunger Vital Sign     Worried About Running Out of Food in the Last Year: Never true     Ran Out of Food in the Last Year: Sometimes true   Physical Activity: Unknown (5/29/2024)    Exercise Vital Sign     Minutes of Exercise per Session: 0 min   Stress: No Stress Concern Present (5/29/2024)    [employer] [facility] [employer] [facility] [employer] - Occupational Stress Questionnaire     Feeling of Stress : Not at all   Housing Stability: High Risk (5/29/2024)    Housing Stability Vital Sign     Unable to Pay for Housing in the Last Year: Yes

## 2025-05-16 ENCOUNTER — TELEPHONE (OUTPATIENT)
Dept: PODIATRY | Facility: CLINIC | Age: 45
End: 2025-05-16
Payer: COMMERCIAL

## 2025-05-16 NOTE — TELEPHONE ENCOUNTER
Call patient in regards to missed appointment on 05/16/2025 with Dr. Renteria. Patient answer and  was able to rescheduled patient for the next availability, patent verbally confirmed new appointment date and time.

## 2025-05-23 ENCOUNTER — TELEPHONE (OUTPATIENT)
Dept: PODIATRY | Facility: CLINIC | Age: 45
End: 2025-05-23
Payer: COMMERCIAL

## 2025-05-23 NOTE — TELEPHONE ENCOUNTER
Attempted to contact patient in regards to her missed appointment on 05/23/2025 w/Dr. Renteria(Podiatry) and to contact office at [phone] to reschedule

## 2025-07-13 ENCOUNTER — HOSPITAL ENCOUNTER (EMERGENCY)
Facility: HOSPITAL | Age: 45
Discharge: HOME | End: 2025-07-14
Attending: STUDENT IN AN ORGANIZED HEALTH CARE EDUCATION/TRAINING PROGRAM
Payer: COMMERCIAL

## 2025-07-13 DIAGNOSIS — R06.02 SHORTNESS OF BREATH: ICD-10-CM

## 2025-07-13 DIAGNOSIS — M54.6 ACUTE RIGHT-SIDED THORACIC BACK PAIN: Primary | ICD-10-CM

## 2025-07-13 LAB
ABSOLUTE EOSINOPHIL (OHS): 0.06 K/UL
ABSOLUTE MONOCYTE (OHS): 0.62 K/UL (ref 0.3–1)
ABSOLUTE NEUTROPHIL COUNT (OHS): 5.2 K/UL (ref 1.8–7.7)
ALBUMIN SERPL BCP-MCNC: 3.7 G/DL (ref 3.5–5.2)
ALP SERPL-CCNC: 89 UNIT/L (ref 40–150)
ALT SERPL W/O P-5'-P-CCNC: 13 UNIT/L (ref 10–44)
ANION GAP (OHS): 11 MMOL/L (ref 8–16)
AST SERPL-CCNC: 19 UNIT/L (ref 11–45)
BASOPHILS # BLD AUTO: 0.03 K/UL
BASOPHILS NFR BLD AUTO: 0.3 %
BILIRUB SERPL-MCNC: 1.1 MG/DL (ref 0.1–1)
BUN SERPL-MCNC: 10 MG/DL (ref 6–20)
BUN SERPL-MCNC: 10 MG/DL (ref 6–30)
CALCIUM SERPL-MCNC: 9.1 MG/DL (ref 8.7–10.5)
CHLORIDE SERPL-SCNC: 103 MMOL/L (ref 95–110)
CHLORIDE SERPL-SCNC: 105 MMOL/L (ref 95–110)
CO2 SERPL-SCNC: 23 MMOL/L (ref 23–29)
CREAT SERPL-MCNC: 0.8 MG/DL (ref 0.5–1.4)
CREAT SERPL-MCNC: 0.9 MG/DL (ref 0.5–1.4)
D DIMER PPP IA.FEU-MCNC: 1.49 MG/L FEU
ERYTHROCYTE [DISTWIDTH] IN BLOOD BY AUTOMATED COUNT: 13.1 % (ref 11.5–14.5)
GFR SERPLBLD CREATININE-BSD FMLA CKD-EPI: >60 ML/MIN/1.73/M2
GLUCOSE SERPL-MCNC: 87 MG/DL (ref 70–110)
GLUCOSE SERPL-MCNC: 92 MG/DL (ref 70–110)
HCT VFR BLD AUTO: 36 % (ref 37–48.5)
HCT VFR BLD CALC: 38 %PCV (ref 36–54)
HGB BLD-MCNC: 12.3 GM/DL (ref 12–16)
IMM GRANULOCYTES # BLD AUTO: 0.03 K/UL (ref 0–0.04)
IMM GRANULOCYTES NFR BLD AUTO: 0.3 % (ref 0–0.5)
LYMPHOCYTES # BLD AUTO: 2.65 K/UL (ref 1–4.8)
MCH RBC QN AUTO: 28.6 PG (ref 27–31)
MCHC RBC AUTO-ENTMCNC: 34.2 G/DL (ref 32–36)
MCV RBC AUTO: 84 FL (ref 82–98)
NUCLEATED RBC (/100WBC) (OHS): 0 /100 WBC
PLATELET # BLD AUTO: 248 K/UL (ref 150–450)
PMV BLD AUTO: 11.7 FL (ref 9.2–12.9)
POC IONIZED CALCIUM: 1.2 MMOL/L (ref 1.06–1.42)
POC TCO2 (MEASURED): 24 MMOL/L (ref 23–29)
POTASSIUM BLD-SCNC: 3.5 MMOL/L (ref 3.5–5.1)
POTASSIUM SERPL-SCNC: 3.5 MMOL/L (ref 3.5–5.1)
PROT SERPL-MCNC: 8.2 GM/DL (ref 6–8.4)
RBC # BLD AUTO: 4.3 M/UL (ref 4–5.4)
RELATIVE EOSINOPHIL (OHS): 0.7 %
RELATIVE LYMPHOCYTE (OHS): 30.8 % (ref 18–48)
RELATIVE MONOCYTE (OHS): 7.2 % (ref 4–15)
RELATIVE NEUTROPHIL (OHS): 60.7 % (ref 38–73)
SAMPLE: NORMAL
SODIUM BLD-SCNC: 140 MMOL/L (ref 136–145)
SODIUM SERPL-SCNC: 139 MMOL/L (ref 136–145)
TROPONIN I SERPL HS-MCNC: <3 NG/L
TROPONIN I SERPL HS-MCNC: <3 NG/L
WBC # BLD AUTO: 8.59 K/UL (ref 3.9–12.7)

## 2025-07-13 PROCEDURE — 85025 COMPLETE CBC W/AUTO DIFF WBC: CPT

## 2025-07-13 PROCEDURE — 80047 BASIC METABLC PNL IONIZED CA: CPT

## 2025-07-13 PROCEDURE — 93010 ELECTROCARDIOGRAM REPORT: CPT | Mod: ,,, | Performed by: INTERNAL MEDICINE

## 2025-07-13 PROCEDURE — 84484 ASSAY OF TROPONIN QUANT: CPT

## 2025-07-13 PROCEDURE — 99285 EMERGENCY DEPT VISIT HI MDM: CPT | Mod: 25

## 2025-07-13 PROCEDURE — 93005 ELECTROCARDIOGRAM TRACING: CPT

## 2025-07-13 PROCEDURE — 96374 THER/PROPH/DIAG INJ IV PUSH: CPT

## 2025-07-13 PROCEDURE — 63600175 PHARM REV CODE 636 W HCPCS: Mod: JZ,TB

## 2025-07-13 PROCEDURE — 94761 N-INVAS EAR/PLS OXIMETRY MLT: CPT

## 2025-07-13 PROCEDURE — 25000003 PHARM REV CODE 250

## 2025-07-13 PROCEDURE — 80053 COMPREHEN METABOLIC PANEL: CPT

## 2025-07-13 PROCEDURE — 85379 FIBRIN DEGRADATION QUANT: CPT

## 2025-07-13 RX ORDER — LIDOCAINE 50 MG/G
1 PATCH TOPICAL
Status: DISCONTINUED | OUTPATIENT
Start: 2025-07-13 | End: 2025-07-14 | Stop reason: HOSPADM

## 2025-07-13 RX ORDER — KETOROLAC TROMETHAMINE 30 MG/ML
15 INJECTION, SOLUTION INTRAMUSCULAR; INTRAVENOUS
Status: COMPLETED | OUTPATIENT
Start: 2025-07-13 | End: 2025-07-13

## 2025-07-13 RX ORDER — ACETAMINOPHEN 325 MG/1
650 TABLET ORAL
Status: COMPLETED | OUTPATIENT
Start: 2025-07-13 | End: 2025-07-13

## 2025-07-13 RX ORDER — METHOCARBAMOL 500 MG/1
500 TABLET, FILM COATED ORAL
Status: DISCONTINUED | OUTPATIENT
Start: 2025-07-13 | End: 2025-07-13

## 2025-07-13 RX ORDER — KETOROLAC TROMETHAMINE 30 MG/ML
10 INJECTION, SOLUTION INTRAMUSCULAR; INTRAVENOUS
Status: DISCONTINUED | OUTPATIENT
Start: 2025-07-13 | End: 2025-07-13

## 2025-07-13 RX ADMIN — ACETAMINOPHEN 650 MG: 325 TABLET ORAL at 08:07

## 2025-07-13 RX ADMIN — LIDOCAINE 1 PATCH: 50 PATCH CUTANEOUS at 08:07

## 2025-07-13 RX ADMIN — KETOROLAC TROMETHAMINE 15 MG: 30 INJECTION, SOLUTION INTRAMUSCULAR; INTRAVENOUS at 08:07

## 2025-07-13 NOTE — Clinical Note
"Saskia "Bhanu Huang was seen and treated in our emergency department on 7/13/2025.  She may return to work on 07/15/2025.       If you have any questions or concerns, please don't hesitate to call.      Judith Saab PA-C"

## 2025-07-14 VITALS
HEART RATE: 67 BPM | DIASTOLIC BLOOD PRESSURE: 74 MMHG | BODY MASS INDEX: 37.04 KG/M2 | WEIGHT: 236 LBS | HEIGHT: 67 IN | TEMPERATURE: 98 F | SYSTOLIC BLOOD PRESSURE: 133 MMHG | RESPIRATION RATE: 16 BRPM | OXYGEN SATURATION: 98 %

## 2025-07-14 LAB
HOLD SPECIMEN: NORMAL
OHS QRS DURATION: 82 MS
OHS QTC CALCULATION: 451 MS

## 2025-07-14 PROCEDURE — 25500020 PHARM REV CODE 255: Performed by: STUDENT IN AN ORGANIZED HEALTH CARE EDUCATION/TRAINING PROGRAM

## 2025-07-14 RX ORDER — NAPROXEN 500 MG/1
500 TABLET ORAL 2 TIMES DAILY WITH MEALS
Qty: 60 TABLET | Refills: 0 | Status: SHIPPED | OUTPATIENT
Start: 2025-07-14 | End: 2025-07-15

## 2025-07-14 RX ORDER — METHOCARBAMOL 500 MG/1
500 TABLET, FILM COATED ORAL 3 TIMES DAILY PRN
Qty: 15 TABLET | Refills: 0 | Status: SHIPPED | OUTPATIENT
Start: 2025-07-14 | End: 2025-07-19

## 2025-07-14 RX ADMIN — IOHEXOL 75 ML: 350 INJECTION, SOLUTION INTRAVENOUS at 12:07

## 2025-07-14 NOTE — ED PROVIDER NOTES
Encounter Date: 7/13/2025       History     Chief Complaint   Patient presents with    Headache    Back Pain     X2 days. Worst with palpation, positional changes, and taking a deep breath     44 year old female with a pMHx of HTN presents to the emergency department with right sided back pain and headache. Patient states she woke up 2 days ago with pain in the middle-right side of her back. Her pain worsens with inspiration, palpation, and when she changes positions. She initially thought it was gas pains, so she took GasX but did not find relief. She states she is passing flatus and is having normal bowel movements. She also developed a diffuse headache yesterday that has been persistent. She rates her headache 8/10 in severity. She denies any vision changes. She also has noticed increased work of breathing at rest. She is concerned that she has walking pneumonia, but denies any sick contacts. She denies recent strenuous activity or injury. She denies cough, fever, chills, sore throat, rhinorrhea, chest pain, vomiting, diarrhea, constipation, dysuria, increased frequency of urination, or extremity weakness.    The history is provided by the patient.     Review of patient's allergies indicates:  No Known Allergies  Past Medical History:   Diagnosis Date    Benign essential hypertension 03/15/2018    History of cervical cerclage     Hypertension      History reviewed. No pertinent surgical history.  Family History   Problem Relation Name Age of Onset    Hypertension Mother      No Known Problems Father      Diabetes Paternal Grandmother      Depression Neg Hx      Hearing loss Neg Hx      Melanoma Neg Hx       Social History[1]  Review of Systems    Physical Exam     Initial Vitals [07/13/25 1809]   BP Pulse Resp Temp SpO2   133/84 95 17 98.5 °F (36.9 °C) 98 %      MAP       --         Physical Exam    Nursing note and vitals reviewed.  Constitutional: She appears well-nourished. She is not diaphoretic. No distress.    Well appearing female sitting upright in bed.   HENT:   Head: Normocephalic and atraumatic.   Eyes: Conjunctivae are normal.   Neck:   Normal range of motion.  Cardiovascular:  Normal rate and regular rhythm.           No murmur heard.  Pulmonary/Chest: Breath sounds normal. No respiratory distress. She has no wheezes.   Abdominal: Abdomen is soft. She exhibits no distension. There is no abdominal tenderness. There is no rebound and no guarding.   Musculoskeletal:      Cervical back: Normal range of motion.      Comments: Tenderness to palpation of right side of back at the level of the upper/mid-thoracic vertebrae. No midline spine tenderness. No lower extremity edema or calf/popliteal tenderness.     Neurological: She is alert and oriented to person, place, and time. GCS score is 15. GCS eye subscore is 4. GCS verbal subscore is 5. GCS motor subscore is 6.   Skin: Skin is warm and dry.   No lower extremity pallor or erythema.   Psychiatric: She has a normal mood and affect.         ED Course   Procedures  Labs Reviewed   D DIMER, QUANTITATIVE - Abnormal       Result Value    D-Dimer 1.49 (*)    CBC WITH DIFFERENTIAL - Abnormal    WBC 8.59      RBC 4.30      HGB 12.3      HCT 36.0 (*)     MCV 84      MCH 28.6      MCHC 34.2      RDW 13.1      Platelet Count 248      MPV 11.7      Nucleated RBC 0      Neut % 60.7      Lymph % 30.8      Mono % 7.2      Eos % 0.7      Basophil % 0.3      Imm Grans % 0.3      Neut # 5.20      Lymph # 2.65      Mono # 0.62      Eos # 0.06      Baso # 0.03      Imm Grans # 0.03     TROPONIN I HIGH SENSITIVITY - Normal    Troponin High Sensitive <3     CBC W/ AUTO DIFFERENTIAL    Narrative:     The following orders were created for panel order CBC auto differential.  Procedure                               Abnormality         Status                     ---------                               -----------         ------                     CBC with Differential[[id number]]       Abnormal             Final result                 Please view results for these tests on the individual orders.   COMPREHENSIVE METABOLIC PANEL   TROPONIN I HIGH SENSITIVITY   B-TYPE NATRIURETIC PEPTIDE   POCT URINE PREGNANCY   ISTAT PROCEDURE    POC Glucose 92      POC BUN 10      POC Creatinine 0.9      POC Sodium 140      POC Potassium 3.5      POC Chloride 103      POC TCO2 (MEASURED) 24      POC Ionized Calcium 1.20      POC Hematocrit 38      Sample JERZY     ISTAT CHEM8          Imaging Results              X-Ray Chest PA And Lateral (Final result)  Result time 07/13/25 21:35:23      Final result by Addy Adair MD (07/13/25 21:35:23)                   Impression:      Slight blunting of the CP angles and posterior sulci suggesting minimal effusions.      Electronically signed by: Addy Adair MD  Date:    07/13/2025  Time:    21:35               Narrative:    EXAMINATION:  XR CHEST PA AND LATERAL    CLINICAL HISTORY:  Chest Pain;    TECHNIQUE:  PA and lateral views of the chest were performed.    COMPARISON:  None.    FINDINGS:  Slight blunting of the CP angles and posterior sulci suggesting minimal effusions.    There is no consolidation or pneumothorax.    Cardiomediastinal silhouette is unremarkable.    Regional osseous structures are unremarkable.                                       Medications   LIDOcaine 5 % patch 1 patch (1 patch Transdermal Patch Applied 7/13/25 2020)   acetaminophen tablet 650 mg (650 mg Oral Given 7/13/25 2020)   ketorolac injection 15 mg (15 mg Intravenous Given 7/13/25 2021)     Medical Decision Making  Patient is a 44 year-old F who presents to the emergency department with complaints of headache, right mid-upper back pain, and shortness of breath. Patient is non-toxic appearing, afebrile, and hemodynamically stable.     Differential diagnosis includes but is not limited to pulmonary embolism, muscle strain, herniated disc, pneumonia, rib fracture, costochondritis,  urolithiasis    Labs and imaging reviewed, d-dimer elevated, will order CTA chest to evaluate for PE, remainder of labs pending at time of sign out. See ED course for full details. Patient was given Toradol, acetaminophen, and lidocaine patch. Upon reassessment, her back pain has improved and her headache has subsided.     Disposition pending  Due to provider shift change, care handed over to Judith BLOUNT pending labs, CT, and further evaluation. Patient reassessed and given opportunity to ask additional questions prior to disposition.    Amount and/or Complexity of Data Reviewed  Labs: ordered. Decision-making details documented in ED Course.  Radiology: ordered. Decision-making details documented in ED Course.    Risk  OTC drugs.  Prescription drug management.               ED Course as of 07/13/25 2215   Sun Jul 13, 2025 2115 D-Dimer(!): 1.49  Will order CTA chest given she is short of breath and having pain in the right side of her back, worse with inspiration, c/f PE [NK]   2115 Troponin I High Sensitivity: <3 [NK]   2117 CBC auto differential(!)  No leukocytosis or anemia  [NK]   2139 X-Ray Chest PA And Lateral  Some blunting of costophrenic angles noted [NK]   2140 Comprehensive metabolic panel  Results delayed 2/2 hemolysis, pending at time of sign out [NK]      ED Course User Index  [NK] Yevgeniy Montero PA-C                               Clinical Impression:  Final diagnoses:  [M54.6] Acute right-sided thoracic back pain (Primary)  [R06.02] Shortness of breath                       [1]   Social History  Tobacco Use    Smoking status: Never     Passive exposure: Never    Smokeless tobacco: Never   Substance Use Topics    Alcohol use: Yes     Comment: occasional, on holidays    Drug use: No        Yevgeniy Monteor PA-C  07/13/25 2215

## 2025-07-14 NOTE — PROVIDER PROGRESS NOTES - EMERGENCY DEPT.
"Encounter Date: 7/13/2025    ED Physician Progress Notes        Physician Note:   ED Initial provider Note: "44 year old female with a pMHx of HTN presents to the emergency department with right sided back pain and headache. Patient states she woke up 2 days ago with pain in the middle right side of her back. Her pain worsens with inspiration, palpation, and when she changes positions. She initially thought it was gas pains, so she took GasX but did not find relief. She states she is passing flatus and is having normal bowel movements. She then developed a diffuse headache yesterday that has been persistent. She rates her headache 8/10 in severity. She denies any vision changes. She also has noticed increased work of breathing at rest. She is concerned that she has walking pneumonia. She denies recent strenuous activity. She denies cough, fever, chills, sore throat, rhinorrhea, chest pain, vomiting, diarrhea, constipation, dysuria, increased frequency of urination, or extremity weakness."    ED Course: I assumed care of patient from off-going ED physician team. Briefly, Patient is a 44-year-old female presenting today with a headache and right thoracic back pain.  She has associated shortness of breath.  Her dimer is elevated.      At the time of signout plan was pending Pending CTA, CMP,  2nd troponin.    CTA is negative for PE.  Second troponin remains flat.  Chemistry notable for mildly elevated bilirubin however this appears to be chronic.  Patient reassessed and is feeling better.  She is resting comfortably on reexamination.    Medications given in the ED:    Medications  LIDOcaine 5 % patch 1 patch (1 patch Transdermal Patch Applied 7/13/25 2020)   acetaminophen tablet 650 mg (650 mg Oral Given 7/13/25 2020)  ketorolac injection 15 mg (15 mg Intravenous Given 7/13/25 2021)    Disposition:  Home    Patient comfortable with disposition and treatment plan. Patient counseled regarding exam, results, diagnosis, " treatment, and plan.    Impression:     - Right thoracic back pain   - Headache

## 2025-07-14 NOTE — ED TRIAGE NOTES
Patient endorses right sided back pain, she stated it feels like gas pain. She took Gas X last night with no relief. Patient denies C/P,SOB and N/V/D at this time.

## 2025-07-14 NOTE — DISCHARGE INSTRUCTIONS
I recommend that you take Naproxen as prescribed for pain. Make sure to take with meals to prevent gastritis/stomach ulcers   You were also prescribed a muscle relaxer for back pain. This medication is sedating. Do not operate heavy machinery, drive or drink alcohol while taking this. You make take this with ibuprofen  Lidocaine patches prescribed for topical pain relief. You may use this with above medications   Practice stretches as discussed   Follow up with you PCP if your pain does not improve   Strict ED precautions given to return immediately for new, worsening, or concerning symptoms

## 2025-07-14 NOTE — ED NOTES
..I-STAT Chem-8+ Results:   Value Reference Range   Sodium 140 136-145 mmol/L   Potassium  3.5 3.5-5.1 mmol/L   Chloride 103  mmol/L   Ionized Calcium 1.20 1.06-1.42 mmol/L   CO2 (measured) 24 23-29 mmol/L   Glucose 92  mg/dL   BUN 10 6-30 mg/dL   Creatinine 0.9 0.5-1.4 mg/dL   Hematocrit 38 36-54%

## 2025-07-15 ENCOUNTER — OFFICE VISIT (OUTPATIENT)
Dept: INTERNAL MEDICINE | Facility: CLINIC | Age: 45
End: 2025-07-15
Payer: COMMERCIAL

## 2025-07-15 ENCOUNTER — HOSPITAL ENCOUNTER (OUTPATIENT)
Dept: RADIOLOGY | Facility: HOSPITAL | Age: 45
Discharge: HOME | End: 2025-07-15
Attending: INTERNAL MEDICINE
Payer: COMMERCIAL

## 2025-07-15 VITALS
HEART RATE: 82 BPM | SYSTOLIC BLOOD PRESSURE: 128 MMHG | OXYGEN SATURATION: 98 % | DIASTOLIC BLOOD PRESSURE: 84 MMHG | BODY MASS INDEX: 39.42 KG/M2 | WEIGHT: 251.13 LBS | HEIGHT: 67 IN

## 2025-07-15 DIAGNOSIS — D64.9 ANEMIA, UNSPECIFIED TYPE: ICD-10-CM

## 2025-07-15 DIAGNOSIS — R10.9 RIGHT FLANK PAIN: Primary | ICD-10-CM

## 2025-07-15 DIAGNOSIS — E55.9 VITAMIN D DEFICIENCY: ICD-10-CM

## 2025-07-15 DIAGNOSIS — R10.9 RIGHT FLANK PAIN: ICD-10-CM

## 2025-07-15 DIAGNOSIS — E66.813 CLASS 3 SEVERE OBESITY DUE TO EXCESS CALORIES WITH SERIOUS COMORBIDITY AND BODY MASS INDEX (BMI) OF 40.0 TO 44.9 IN ADULT: ICD-10-CM

## 2025-07-15 DIAGNOSIS — I10 BENIGN ESSENTIAL HYPERTENSION: ICD-10-CM

## 2025-07-15 LAB
BACTERIA #/AREA URNS AUTO: ABNORMAL /HPF
BILIRUB UR QL STRIP.AUTO: NEGATIVE
CLARITY UR: CLEAR
COLOR UR AUTO: YELLOW
GLUCOSE UR QL STRIP: NEGATIVE
HGB UR QL STRIP: ABNORMAL
KETONES UR QL STRIP: NEGATIVE
LEUKOCYTE ESTERASE UR QL STRIP: ABNORMAL
MICROSCOPIC COMMENT: ABNORMAL
NITRITE UR QL STRIP: NEGATIVE
PH UR STRIP: 7 [PH]
PROT UR QL STRIP: NEGATIVE
RBC #/AREA URNS AUTO: 2 /HPF (ref 0–4)
SP GR UR STRIP: 1.01
SQUAMOUS #/AREA URNS AUTO: 8 /HPF
UROBILINOGEN UR STRIP-ACNC: NEGATIVE EU/DL
WBC #/AREA URNS AUTO: 19 /HPF (ref 0–5)

## 2025-07-15 PROCEDURE — 76770 US EXAM ABDO BACK WALL COMP: CPT | Mod: TC

## 2025-07-15 PROCEDURE — 81001 URINALYSIS AUTO W/SCOPE: CPT | Performed by: INTERNAL MEDICINE

## 2025-07-15 PROCEDURE — 99999 PR PBB SHADOW E&M-EST. PATIENT-LVL IV: CPT | Mod: PBBFAC,,, | Performed by: INTERNAL MEDICINE

## 2025-07-15 PROCEDURE — 76770 US EXAM ABDO BACK WALL COMP: CPT | Mod: 26,,, | Performed by: RADIOLOGY

## 2025-07-15 PROCEDURE — 87086 URINE CULTURE/COLONY COUNT: CPT | Performed by: INTERNAL MEDICINE

## 2025-07-15 RX ORDER — ERGOCALCIFEROL 1.25 MG/1
50000 CAPSULE ORAL
Qty: 24 CAPSULE | Refills: 3 | Status: SHIPPED | OUTPATIENT
Start: 2025-07-17

## 2025-07-15 NOTE — PROGRESS NOTES
INTERNAL MEDICINE ESTABLISHED PATIENT VISIT NOTE    Subjective:     Chief Complaint: Follow-up  ER f/u, HTN     Patient ID: Saskia Huang is a 44 y.o. female with HTN, hx migraines, chronic back pain (had ED visit in Nov for sciatic sx), Vit D def, hx shingles, last seen by me in Jan for her annual exam, here today for f/u HTN.    On Amlodipine and HCTZ for HTN but has not yet taken meds this morning.  Not taking Ergocalciferol consistently.    Had ED visit 2 days ago due to h/a and R sided back pain x 2 days.  States pain is mostly in her mid R back and worse c lying flat and worse when movement or position changes.  Denies dysuria or hematuria.    Trop neg x 2.  CMP stable (borderline T bili unchanged from previous).  CBC c marginal anemia.    CXR showed slight blunting of CP angles and post sulci suggesting minimal effusions but no consolidation or ptx.  Labs c elevated D dimer so CTA done which showed no PE and subsegmental atelectactic changes at the lung bases B, R>L.  Was tx'ed c Lidocaine patch and Toradol injection and discharged home.    States pain is improving but not yet completely resolved.    LMP 7/4.      Past Medical History:  Past Medical History:   Diagnosis Date    Benign essential hypertension 03/15/2018    History of cervical cerclage     Hypertension        Home Medications:  Prior to Admission medications    Medication Sig Start Date End Date Taking? Authorizing Provider   amLODIPine (NORVASC) 5 MG tablet Take 1 tablet by mouth once daily 1/13/25   Pebbles Bernal MD   ciclopirox (PENLAC) 8 % Soln Apply topically nightly. Remove once a week with nail polish remover 10/11/24   Rosa Renteria DPM   ergocalciferol (ERGOCALCIFEROL) 50,000 unit Cap Take 1 capsule (50,000 Units total) by mouth twice a week. 1/13/25   Pebbles Bernal MD   gabapentin (NEURONTIN) 100 MG capsule Take 1 capsule (100 mg total) by mouth 3 (three) times daily. 1/13/25 1/13/26  Pebbles Bernal MD   hydroCHLOROthiazide  "(HYDRODIURIL) 25 MG tablet Take 1 tablet by mouth once daily 1/13/25   Pebbles Bernal MD   methocarbamoL (ROBAXIN) 500 MG Tab Take 1 tablet (500 mg total) by mouth 3 (three) times daily as needed. 7/14/25 7/19/25  Judith Saab PA-C   naproxen (NAPROSYN) 500 MG tablet Take 1 tablet (500 mg total) by mouth 2 (two) times daily with meals. 7/14/25   Judith Saab PA-C       Allergies:  Review of patient's allergies indicates:  No Known Allergies    Social History:  Social History[1]     Review of Systems   Constitutional:  Negative for appetite change, chills, fatigue, fever and unexpected weight change.   HENT:  Negative for congestion, hearing loss and rhinorrhea.    Eyes:  Negative for pain and visual disturbance.   Respiratory:  Negative for cough, chest tightness, shortness of breath and wheezing.    Cardiovascular:  Negative for chest pain, palpitations and leg swelling.   Gastrointestinal:  Negative for abdominal distention and abdominal pain.   Endocrine: Negative for polydipsia and polyuria.   Genitourinary:  Negative for decreased urine volume, difficulty urinating, dysuria, hematuria and vaginal discharge.   Neurological:  Negative for weakness, numbness and headaches.   Psychiatric/Behavioral:  Negative for behavioral problems and confusion.          Health Maintenance:     Immunizations:   Influenza rec today, pt declined.  Risks and benefits were discussed with patient.  TDap 1/2020  COVID 8/2021, 9/2021, rec schedule Omicron booster.  Pt declined.     Cancer Screening:  PAP:  2/2024 [employer] hernesto Lopez ASCUS, shift in petra suggestive of BV, HPV neg.  [facility] done 5/2025  Mammogram: 2/2025 benign  Colonoscopy: rec at 47    Objective:   /84   Pulse 82   Ht 5' 7" (1.702 m)   Wt 113.9 kg (251 lb 1.7 oz)   SpO2 98%   BMI 39.33 kg/m²        General: AAO x3, no apparent distress  HEENT: no cervical LAD, no thyroid masses appreciated  CV: RRR, no m/r/g  Pulm: Lungs CTAB, no crackles, no " wheezes  Abd: s/NT/ND +BS  Extremities: no c/c/e  Back: no CVA tenderness    Labs:     Lab Results   Component Value Date    WBC 8.59 07/13/2025    HGB 12.3 07/13/2025    HCT 38 07/13/2025    MCV 84 07/13/2025     07/13/2025     Sodium   Date Value Ref Range Status   07/13/2025 139 136 - 145 mmol/L Final   01/13/2025 138 136 - 145 mmol/L Final     Potassium   Date Value Ref Range Status   07/13/2025 3.5 3.5 - 5.1 mmol/L Final   01/13/2025 3.9 3.5 - 5.1 mmol/L Final     Chloride   Date Value Ref Range Status   07/13/2025 105 95 - 110 mmol/L Final   01/13/2025 105 95 - 110 mmol/L Final     CO2   Date Value Ref Range Status   07/13/2025 23 23 - 29 mmol/L Final   01/13/2025 22 (L) 23 - 29 mmol/L Final     Glucose   Date Value Ref Range Status   07/13/2025 87 70 - 110 mg/dL Final   01/13/2025 86 70 - 110 mg/dL Final     BUN   Date Value Ref Range Status   07/13/2025 10 6 - 20 mg/dL Final     Creatinine   Date Value Ref Range Status   07/13/2025 0.8 0.5 - 1.4 mg/dL Final     Calcium   Date Value Ref Range Status   07/13/2025 9.1 8.7 - 10.5 mg/dL Final   01/13/2025 9.3 8.7 - 10.5 mg/dL Final     Protein Total   Date Value Ref Range Status   07/13/2025 8.2 6.0 - 8.4 gm/dL Final     Total Protein   Date Value Ref Range Status   01/13/2025 8.3 6.0 - 8.4 g/dL Final     Albumin   Date Value Ref Range Status   07/13/2025 3.7 3.5 - 5.2 g/dL Final   01/13/2025 3.8 3.5 - 5.2 g/dL Final     Total Bilirubin   Date Value Ref Range Status   01/13/2025 1.7 (H) 0.1 - 1.0 mg/dL Final     Comment:     For infants and newborns, interpretation of results should be based  on gestational age, weight and in agreement with clinical  observations.    Premature Infant recommended reference ranges:  Up to 24 hours.............<8.0 mg/dL  Up to 48 hours............<12.0 mg/dL  3-5 days..................<15.0 mg/dL  6-29 days.................<15.0 mg/dL       Bilirubin Total   Date Value Ref Range Status   07/13/2025 1.1 (H) 0.1 - 1.0 mg/dL  Final     Comment:     For infants and newborns, interpretation of results should be based   on gestational age, weight and in agreement with clinical   observations.    Premature Infant recommended reference ranges:   0-24 hours:  <8.0 mg/dL   24-48 hours: <12.0 mg/dL   3-5 days:    <15.0 mg/dL   6-29 days:   <15.0 mg/dL     Alkaline Phosphatase   Date Value Ref Range Status   01/13/2025 75 40 - 150 U/L Final     ALP   Date Value Ref Range Status   07/13/2025 89 40 - 150 unit/L Final     AST   Date Value Ref Range Status   07/13/2025 19 11 - 45 unit/L Final   01/13/2025 21 10 - 40 U/L Final     ALT   Date Value Ref Range Status   07/13/2025 13 10 - 44 unit/L Final   01/13/2025 14 10 - 44 U/L Final     Anion Gap   Date Value Ref Range Status   07/13/2025 11 8 - 16 mmol/L Final     eGFR if    Date Value Ref Range Status   03/22/2022 >60.0 >60 mL/min/1.73 m^2 Final     eGFR if non    Date Value Ref Range Status   03/22/2022 >60.0 >60 mL/min/1.73 m^2 Final     Comment:     Calculation used to obtain the estimated glomerular filtration  rate (eGFR) is the CKD-EPI equation.        Lab Results   Component Value Date    HGBA1C 5.0 01/13/2025     Lab Results   Component Value Date    LDLCALC 106.2 01/13/2025     Lab Results   Component Value Date    TSH 2.336 01/13/2025         Assessment/Plan     Saskia was seen today for follow-up.    Diagnoses and all orders for this visit:    Right flank pain  As per HPI  Likely musculoskeletal  Rec prn otc NSAIDS/tylenol as needed.  Will also add u/a and renal u/s since sx have persisted.  -     US Retroperitoneal Complete; Future  -     Urinalysis, Reflex to Urine Culture Urine, Clean Catch    Anemia, unspecified type  Noted on labs from ED  Will repeat today c iron studies.  -     CBC Auto Differential; Future  -     Iron and TIBC; Future  -     Ferritin; Future    Benign essential hypertension  Repeat manual at goal  Cont meds    Vitamin D  deficiency  Not taking supplement consistently  Advised to take twice weekly as rx'ed.  -     ergocalciferol (ERGOCALCIFEROL) 50,000 unit Cap; Take 1 capsule (50,000 Units total) by mouth twice a week.    Class 3 severe obesity due to excess calories with serious comorbidity and body mass index (BMI) of 40.0 to 44.9 in adult  Fluctuating but marginally improved since Jan visit  Under increased stress  Counseled extensively on need for diet changes and daily exercise.  Discussed examples of healthy eating.  Recommend at least 30 minutes of exercise at least 5 days a week.        HM as above  RTC in 6 mos for annual exam, sooner if needed.  Labs and urine today.    Pebbles Bernal MD  Department of Internal Medicine - [facility]  07/15/2025         [1]   Social History  Tobacco Use    Smoking status: Never     Passive exposure: Never    Smokeless tobacco: Never   Substance Use Topics    Alcohol use: Yes     Comment: occasional, on holidays    Drug use: No

## 2025-07-16 LAB — HOLD SPECIMEN: NORMAL

## 2025-07-17 LAB — BACTERIA UR CULT: NORMAL
